# Patient Record
Sex: MALE | Race: BLACK OR AFRICAN AMERICAN | Employment: OTHER | URBAN - METROPOLITAN AREA
[De-identification: names, ages, dates, MRNs, and addresses within clinical notes are randomized per-mention and may not be internally consistent; named-entity substitution may affect disease eponyms.]

---

## 2018-07-02 ENCOUNTER — OFFICE VISIT (OUTPATIENT)
Dept: ORTHOPEDIC SURGERY | Age: 63
End: 2018-07-02

## 2018-07-02 VITALS
SYSTOLIC BLOOD PRESSURE: 108 MMHG | TEMPERATURE: 97.8 F | BODY MASS INDEX: 30.45 KG/M2 | HEIGHT: 67 IN | WEIGHT: 194 LBS | DIASTOLIC BLOOD PRESSURE: 75 MMHG | HEART RATE: 61 BPM

## 2018-07-02 DIAGNOSIS — M16.12 ARTHRITIS OF LEFT HIP: Primary | ICD-10-CM

## 2018-07-02 PROCEDURE — 99203 OFFICE O/P NEW LOW 30 MIN: CPT | Performed by: ORTHOPAEDIC SURGERY

## 2018-07-02 PROCEDURE — G8427 DOCREV CUR MEDS BY ELIG CLIN: HCPCS | Performed by: ORTHOPAEDIC SURGERY

## 2018-07-02 PROCEDURE — G8417 CALC BMI ABV UP PARAM F/U: HCPCS | Performed by: ORTHOPAEDIC SURGERY

## 2018-07-02 PROCEDURE — 4040F PNEUMOC VAC/ADMIN/RCVD: CPT | Performed by: ORTHOPAEDIC SURGERY

## 2018-07-02 PROCEDURE — 1123F ACP DISCUSS/DSCN MKR DOCD: CPT | Performed by: ORTHOPAEDIC SURGERY

## 2018-07-02 PROCEDURE — 4004F PT TOBACCO SCREEN RCVD TLK: CPT | Performed by: ORTHOPAEDIC SURGERY

## 2018-07-02 PROCEDURE — 3017F COLORECTAL CA SCREEN DOC REV: CPT | Performed by: ORTHOPAEDIC SURGERY

## 2018-07-02 RX ORDER — CYCLOBENZAPRINE HCL 10 MG
10 TABLET ORAL 3 TIMES DAILY PRN
Qty: 90 TABLET | Refills: 0 | Status: SHIPPED | OUTPATIENT
Start: 2018-07-02 | End: 2018-07-16 | Stop reason: SDUPTHER

## 2018-07-02 NOTE — LETTER
[facility] Ortho & Spine  Surgery Scheduling Form:  [facility]    DEMOGRAPHICS:                                                                                                                  Patient Name:  Walter Urban  Patient :  1955   Patient SS#:  [id number]    Patient Phone:  [phone] (home)                             Patient Address:  [address] [place] [address]    PCP:  No primary care provider on file. Insurance:   Payor/Plan Subscr  Sex Relation Sub. Ins. ID Effective Group Num   1. [place] 1955 Male Self [id number]                                    [address]   2. MEDICARE - MEAnniece Look 1955 Male  [id number] 10/1/16                                    [address] [address]     DIAGNOSIS & PROCEDURE:                                                                                              Diagnosis:   right hip arthritis  Operation:  Right Anterior Total Hip Replacement with C-Arm  Location: [facility]  Surgeon:  Yadira Jamil MD    SCHEDULING INFORMATION:                                                                                         .  Surgeon's Scheduling Instruction:  elective    Requested Date:    OR Time:   Patient Arrival Time:      OR Time Required:  90  Minutes  Anesthesia:  General  Equipment:  [place] Table, depuy, advanced  Mini C-Arm:  No   Standard C-Arm:  Yes  Status:    SAME DAY ADMIT  PAT Required:  Yes  Comments: NO femoral nerve block    ALLERGIES:Patient has no known allergies. Vitor Gaona.  Salena Hinds MD      18 2:46 PM  BILLING INFORMATION:                                                                                                       Procedure:       CPT Code Modifier                                              H&P AT :      PCP ___XX__                  URGENT CARE ______    Walter Urban  RIGHT  TOTAL HIP REPLACEMENT trust, it is your duty to keep everything you hear confidential.  Nothing that identifies a participant in any way (including job, ethnicity, [employer], etc.) can be shared outside of this group setting. I have read and agree to the following statements:        · I agree to meet with a group of patients and my doctor. I understand that I have the choice to be seen by my physician in this group or individually and that I may leave the group visit anytime I feel uncomfortable. · I understand that discussions will occur regarding individual identifiable health information during the shared medical appointment and I will maintain the confidentiality of [facility] health information or other information heard during the appointment. · I am committed to maintaining this confidentiality even if I am no longer participating in shared medical appointments. · I understand that the information I share with the physician and staff will be heard by the other participants and there is a possibility that the information disclosed in the shared medical appointment may be re-disclosed by other participants. I have been notified of this potential disclosure and I voluntarily agree to participate in the shared medical appointment. · I know that I dont have to share any personal information with the group or health care providers unless, I choose to do so. · Like any doctors appointment, I agree to be responsible for the bill and / or co-payment associated with this physician appointment. Shared Medical Appointment              THIS SUPPLEMENTAL NOTICE SUPPLEMENTS AND DOES NOT REPLACE THE [facility] CONSENT, PATIENT RESPONSIBILITY AND NOTICE OF PRIVACY PRACTICE.         [facility]    1955        Patients Signature: ____________________________________________________         DATE: ____/____/___      Herbert Russo / Support Persons Signature (if applicable)

## 2018-07-04 NOTE — PROGRESS NOTES
Patient is a 57-year-old male presents today complaining of bilateral hip pain has been going on for over 2 years. [facility] and was told he needs a total hip however it was too complex for them to do. He's here now complaining of pain loss of range of motion and difficulty with ambulation. He rates his pain at up to 7-8 out of 10 has difficulty with ambulating and is now significantly affecting his daily activities and also his quality of life now is impinged upon as all activities hurt. The patient has no history of injury or surgery to the right hip or the left hip. He complains of left hip significantly worse than the right hip this far as its pain is concerned. The patient has no history of diabetes or family history. Patient states he does not take narcotics and has no DVT history. He also states he is not using any hormone replacement therapy. The patient has been treated with anti-inflammatories and some mild physical therapy however the therapy just increases his symptoms and the anti-inflammatories are not as effective as we would hope. Patient states that he does not smoke cigarettes but occasionally smokes cannabis. He's here to discuss possible total hip arthroplasty. There is no problem list on file for this patient. Prior to Visit Medications    Medication Sig Taking? Authorizing Provider   MELOXICAM PO Take by mouth Yes Historical Provider, MD   Abacavir-Dolutegravir-Lamivud (TRIUMEQ PO) Take by mouth Yes Historical Provider, MD   GABAPENTIN PO Take by mouth Yes Historical Provider, MD   cyclobenzaprine (FLEXERIL) 10 MG tablet Take 1 tablet by mouth 3 times daily as needed for Muscle spasms Yes Carmen Coelho MD     Physical examination 57-year-old male oriented ×3 temperature is 97.8. Examination of his back shows significant loss of range of motion but no specific pain tenderness or instability.   Motor exam shows quadriceps

## 2018-07-16 RX ORDER — CYCLOBENZAPRINE HCL 10 MG
10 TABLET ORAL 3 TIMES DAILY PRN
Qty: 90 TABLET | Refills: 0 | Status: SHIPPED | OUTPATIENT
Start: 2018-07-16 | End: 2018-07-26

## 2018-07-16 NOTE — TELEPHONE ENCOUNTER
[facility] not [facility].   Per [facility] ok to send to [facility] on [address] and MAEGAN [address]

## 2018-07-31 ENCOUNTER — SURG/PROC ORDERS (OUTPATIENT)
Dept: ORTHOPEDIC SURGERY | Age: 63
End: 2018-07-31

## 2018-07-31 ENCOUNTER — HOSPITAL ENCOUNTER (OUTPATIENT)
Dept: PREADMISSION TESTING | Age: 63
Discharge: HOME | End: 2018-07-31
Attending: ORTHOPAEDIC SURGERY | Admitting: ORTHOPAEDIC SURGERY

## 2018-07-31 DIAGNOSIS — M16.12 PRIMARY OSTEOARTHRITIS OF LEFT HIP: Primary | ICD-10-CM

## 2018-07-31 DIAGNOSIS — M16.12 PRIMARY OSTEOARTHRITIS OF LEFT HIP: ICD-10-CM

## 2018-07-31 LAB
ABO/RH: NORMAL
ANTIBODY SCREEN: NORMAL
BILIRUBIN URINE: NEGATIVE
BLOOD, URINE: NEGATIVE
CLARITY: CLEAR
COLOR: YELLOW
ESTIMATED AVERAGE GLUCOSE: 102.5 MG/DL
GLUCOSE URINE: NEGATIVE MG/DL
HBA1C MFR BLD: 5.2 %
KETONES, URINE: NEGATIVE MG/DL
LEUKOCYTE ESTERASE, URINE: NEGATIVE
MICROSCOPIC EXAMINATION: NORMAL
NITRITE, URINE: NEGATIVE
PH UA: 5.5
PREALBUMIN: 21 MG/DL (ref 20–40)
PROTEIN UA: NEGATIVE MG/DL
SPECIFIC GRAVITY UA: 1.01
URINE REFLEX TO CULTURE: NORMAL
URINE TYPE: NORMAL
UROBILINOGEN, URINE: 0.2 E.U./DL
VITAMIN D 25-HYDROXY: 40.5 NG/ML

## 2018-08-01 LAB — MRSA SCREEN RT-PCR: NORMAL

## 2018-08-01 NOTE — PROGRESS NOTES
Pt. Attended JET class on 7/31/18 Pt. Verified surgery for Total HIP replacement and received patient information and educational JET folder. Interviews completed by PT, OT, Case management and PAT. Labs and Tests completed as ordered/necessary. Pt. Given instructions on Pre-operative Showering techniques and the use of anti-septic 3 days before surgery. Anti-septic bottle given to patient to take home. Pt. States no further questions or concerns.

## 2018-08-02 ENCOUNTER — OFFICE VISIT (OUTPATIENT)
Dept: ORTHOPEDIC SURGERY | Age: 63
End: 2018-08-02

## 2018-08-02 DIAGNOSIS — M16.12 PRIMARY OSTEOARTHRITIS OF LEFT HIP: Primary | ICD-10-CM

## 2018-08-02 PROCEDURE — 99999 PR OFFICE/OUTPT VISIT,PROCEDURE ONLY: CPT | Performed by: ORTHOPAEDIC SURGERY

## 2018-08-02 NOTE — PROGRESS NOTES
Latisha Wade is here to discuss surgical options. I had a 45 minute group discussion with greater than 50% of the time counseling the patient regarding joint arthroplasty, its preoperative evaluation, and post operative physical therapy, and pain managaement We went over the surgical procedure of hip replacement risks and complications of hip replacement including bleeding, infection, fracture, dislocation, instability, persistent pain, leg length discrepancy, neurovascular injury,  DVT, pulmonary embolism, post operative cognitive disorders, and need for revision. He  understands these and we will see the patient in the operating room. There were no vitals taken for this visit. As this patient has demonstrated risk factors for osteoporosis, such as age greater than [de-identified] years and evidence of a fracture, I have referred the patient back to the primary care physician for evaluation for osteoporosis, including consideration for DEXA scanning, if this is felt to be clinically indicated. The patient is advised to contact the primary care physician to follow-up for further evaluation. Plan:  left total hip arthroplasty to be tenatively performed on 8/7/18. Please inform the patient's primary care provider.

## 2018-08-07 PROBLEM — M16.11 ARTHRITIS OF RIGHT HIP: Status: ACTIVE | Noted: 2018-08-07

## 2018-08-23 ENCOUNTER — OFFICE VISIT (OUTPATIENT)
Dept: ORTHOPEDIC SURGERY | Age: 63
End: 2018-08-23

## 2018-08-23 VITALS — BODY MASS INDEX: 31.08 KG/M2 | HEIGHT: 67 IN | WEIGHT: 198 LBS | TEMPERATURE: 98.8 F

## 2018-08-23 DIAGNOSIS — Z96.641 HISTORY OF TOTAL HIP ARTHROPLASTY, RIGHT: Primary | ICD-10-CM

## 2018-08-23 PROCEDURE — APPSS15 APP SPLIT SHARED TIME 0-15 MINUTES: Performed by: PHYSICIAN ASSISTANT

## 2018-08-23 PROCEDURE — 99024 POSTOP FOLLOW-UP VISIT: CPT | Performed by: PHYSICIAN ASSISTANT

## 2018-08-27 ENCOUNTER — TELEPHONE (OUTPATIENT)
Dept: ORTHOPEDIC SURGERY | Age: 63
End: 2018-08-27

## 2018-08-27 DIAGNOSIS — M16.11 ARTHRITIS OF RIGHT HIP: ICD-10-CM

## 2018-08-27 RX ORDER — OXYCODONE HYDROCHLORIDE AND ACETAMINOPHEN 5; 325 MG/1; MG/1
1 TABLET ORAL EVERY 4 HOURS PRN
Qty: 60 TABLET | Refills: 0 | Status: SHIPPED | OUTPATIENT
Start: 2018-08-27 | End: 2018-09-06 | Stop reason: SDUPTHER

## 2018-08-27 NOTE — TELEPHONE ENCOUNTER
Patient states he needs a refill of Percocet 5/325 mg, last filled 8/8/18    Preferred Pharmacy [facility]- [phone]  Patient asking if someone can call him to let him know this has been done    He can be reached at [phone]

## 2018-08-27 NOTE — TELEPHONE ENCOUNTER
Patient called back to see if refill would be sent in today- he states that he is in a lot of pain    Pls call patient to let him know if this is going to be addressed today-   [phone]

## 2018-09-06 ENCOUNTER — TELEPHONE (OUTPATIENT)
Dept: ORTHOPEDIC SURGERY | Age: 63
End: 2018-09-06

## 2018-09-06 DIAGNOSIS — M16.11 ARTHRITIS OF RIGHT HIP: ICD-10-CM

## 2018-09-06 RX ORDER — OXYCODONE HYDROCHLORIDE AND ACETAMINOPHEN 5; 325 MG/1; MG/1
1 TABLET ORAL EVERY 6 HOURS PRN
Qty: 60 TABLET | Refills: 0 | Status: SHIPPED | OUTPATIENT
Start: 2018-09-06 | End: 2018-09-20 | Stop reason: SDUPTHER

## 2018-09-10 ENCOUNTER — HOSPITAL ENCOUNTER (OUTPATIENT)
Dept: OTHER | Age: 63
Discharge: HOME | End: 2018-09-17
Attending: ORTHOPAEDIC SURGERY | Admitting: ORTHOPAEDIC SURGERY

## 2018-09-13 ENCOUNTER — OFFICE VISIT (OUTPATIENT)
Dept: ORTHOPEDIC SURGERY | Age: 63
End: 2018-09-13

## 2018-09-13 VITALS — BODY MASS INDEX: 31.82 KG/M2 | HEIGHT: 66 IN | TEMPERATURE: 97.7 F | WEIGHT: 198 LBS

## 2018-09-13 DIAGNOSIS — M16.12 PRIMARY OSTEOARTHRITIS OF LEFT HIP: ICD-10-CM

## 2018-09-13 DIAGNOSIS — Z96.641 HISTORY OF TOTAL HIP ARTHROPLASTY, RIGHT: ICD-10-CM

## 2018-09-13 DIAGNOSIS — M25.552 LEFT HIP PAIN: Primary | ICD-10-CM

## 2018-09-13 PROCEDURE — 1123F ACP DISCUSS/DSCN MKR DOCD: CPT | Performed by: ORTHOPAEDIC SURGERY

## 2018-09-13 PROCEDURE — 3017F COLORECTAL CA SCREEN DOC REV: CPT | Performed by: ORTHOPAEDIC SURGERY

## 2018-09-13 PROCEDURE — 1101F PT FALLS ASSESS-DOCD LE1/YR: CPT | Performed by: ORTHOPAEDIC SURGERY

## 2018-09-13 PROCEDURE — 4040F PNEUMOC VAC/ADMIN/RCVD: CPT | Performed by: ORTHOPAEDIC SURGERY

## 2018-09-13 PROCEDURE — 4004F PT TOBACCO SCREEN RCVD TLK: CPT | Performed by: ORTHOPAEDIC SURGERY

## 2018-09-13 PROCEDURE — G8427 DOCREV CUR MEDS BY ELIG CLIN: HCPCS | Performed by: ORTHOPAEDIC SURGERY

## 2018-09-13 PROCEDURE — 99214 OFFICE O/P EST MOD 30 MIN: CPT | Performed by: ORTHOPAEDIC SURGERY

## 2018-09-13 PROCEDURE — G8417 CALC BMI ABV UP PARAM F/U: HCPCS | Performed by: ORTHOPAEDIC SURGERY

## 2018-09-13 NOTE — PROGRESS NOTES
This dictation was done with Karma Snap dictation and may contain mechanical errors related to translation. Temperature 97.7 °F (36.5 °C), height 5' 6\" (1.676 m), weight 198 lb (89.8 kg). RAPT  RISK ASSESSMENT and PREDICTION TOOL    Name: Lorenza Daniels  YOB: 1955  Surgeon: Dr. Venu Carpenter         Value Score    1). What is your age group? 50 - 65 years  = 2      66 - 75 years = 1     > 75 years = 0       Your score = 2   2). Gender? Male = 2     Female = 1       Your score = 2   3). How far on average can you walk? Two blocks or more (+/- rest) = 2    (a block is 200 qvwrur=330 ft)  1 - 2 blocks (+/- rest) = 1     Housebound (most of time) = 0       Your score = 1   4). Which gait aid do you use? None = 2    (more often than not) Single-point stick = 1     Crutches/walker = 0       Your score = 0   5). Do you use community supports? None or one per week = 1    (home help, meals on wheels, district nursing) Two or more per week = 0       Your score = 1   6). Will you live with someone who can care for you after your operation? yes = 3     no = 0       Your score = 0    Your Total Score (out of 12) = 6       Key: Destination at discharge from acute care predicted by score.   Score < 6  = extended inpatient rehabilitation  Score 6 - 9  = additional intervention to discharge directly home (Rehab in the home)  Score > 9  = directly home      Patient's Preference Prediction Score Agreed destination   [facility] 6 yes   Lorenza Daniels  Date: 9/13/18

## 2018-09-13 NOTE — LETTER
ALLERGIES:Patient has no known allergies. SURG  10-31           JET   10-23                        ________________________________________________________________  PRE-OP ORDERS:  ? CBC WITH DIFFERENTIAL                                                ? TYPE AND SCREEN                                                            ? HgB A1C     ? VITAMIN D LEVEL  ? PREALBUMIN AND ALBUMIN LEVEL                                                                       ? EKG                                                                                        ? NASAL CULUTRE MRSA  ? UAR/if positive repeat UAR on admissioin  ? BMP  ? COAG PROFILE  ? SED RATE  ? PT/OT EVAL AND TEACHING  ? INSTRUCT PT TO STOP ALL NSAIDS, ASPIRIN, BLOOD THINNERS 7 DAYS PRIOR SURGERY  DAY OF SURGERY  ? CEFAZOLIN 2 GM IVPB; IF PATIENT WEIGHS > 80 KG AND SERUM CREATININE <2.5 mg/dl, GIVE 2 GM DOSE WITHIN 1 HOUR OF INCISION. ? IF THE PRE-OP NASAL CULTURE FOR MRSA WAS POSITIVE:   REPEAT NASAL SWAB ON ADMISSION AND ADMINISTER VANCOMYCIN 1 GM IVPB, REDUCE THE DOSE OF VANCOMYCIN  MG IVPB IF PT < 55 KG OR SERUM CREATININE > 2mg/dl; also to get Cefazolin 2 GM or wt based  ? All patients will receive preop Cefazolin 2 GM or wt based  ? APPLY KNEE HIGH ANTI-EMBOLIC AND PNEUMO-BOOTS TO UNOPERATIVE  LEG  ? CELEBREX  200 MG  ORALLY  DAY OF SURGERY  ? ROXICODONE 10MG  ORALLY DAY OF SURGERY  OTHER ORDERS: ____________________________________________________________  PHYSICIAN SIGNATURE:    __________________________DATE:9/13/18 12:01 PM                    Supplemental Confidentiality & Payment Agreement & Supplemental HIPAA Notice for Shared Medical Appointments        During shared medical appointments, you will hear about other participants health issues and personal information.   As a matter of trust, it is your duty to keep everything you hear confidential.  Nothing that identifies a participant in any way (including job, ethnicity, [employer], etc.) can be shared outside of this group setting. I have read and agree to the following statements:        · I agree to meet with a group of patients and my doctor. I understand that I have the choice to be seen by my physician in this group or individually and that I may leave the group visit anytime I feel uncomfortable. · I understand that discussions will occur regarding individual identifiable health information during the shared medical appointment and I will maintain the confidentiality of [facility] health information or other information heard during the appointment. · I am committed to maintaining this confidentiality even if I am no longer participating in shared medical appointments. · I understand that the information I share with the physician and staff will be heard by the other participants and there is a possibility that the information disclosed in the shared medical appointment may be re-disclosed by other participants. I have been notified of this potential disclosure and I voluntarily agree to participate in the shared medical appointment. · I know that I dont have to share any personal information with the group or health care providers unless, I choose to do so. · Like any doctors appointment, I agree to be responsible for the bill and / or co-payment associated with this physician appointment. Shared Medical Appointment              THIS SUPPLEMENTAL NOTICE SUPPLEMENTS AND DOES NOT REPLACE THE [facility] CONSENT, PATIENT RESPONSIBILITY AND NOTICE OF PRIVACY PRACTICE. Wendy Serrano    1955        Patients Signature: ____________________________________________________         DATE: ____/____/___      Driss Aw / Support Persons Signature (if applicable) _________________________     DATE: __/__/__    **Each person will be asked to sign this commitment before each Shared Medical Appointment. Thank You ! SURGERY SCHEDULING TIMES                                                                                                                                                      Latisha Wade                                                                                       1955                                                                           SURGERY DATE: ___/___/___                                                                      SURGERY TIME:  ___:___ AM/PM                                                                      ARRIVAL TIME:   ___:___  AM/PM                                                                                                                       **REPORT TO THE INFORMATION                                                  DESK IN THE MAIN LOBBY OF [address]  Surgery Scheduler    [facility]) Physicians  [facility]  [address] [place] [address]  [address] [facility], [address] [place] [address]  "[employer]    [phone]  Phone                                                                        JET INFO         PT NAME:  Latisha Wade              Patient Phone:  [phone] (home)                                           1955    PCP:  PCP:  Arnav Jordan MD                APPT DATE:  ___/___/___      DATE:  10/19/18        H&P __________    LABS: _________                      NEEDED:  __________    EKG:   _________                     NEEDED:  __________      JET DATE:   _______/_______      SURG DATE:   ________/________

## 2018-09-17 NOTE — PROGRESS NOTES
Patient is a 40-year-old male who is status post right total hip arthroplasty on 8/7/2018. He's done very well has no pain and no concerns and is ambulating with just a cane basically for severe left hip pain. He states that his right hip is doing fine however his left hip is constant pain constant throbbing and wants to consider a moving forward to left total hip arthroplasty sometime in the next few months. Patient has no history of injury or surgery to the left hip. He rates his pain at 0 out of 10 on the right side postop and 8 out of 10 on the left side. Patient Active Problem List   Diagnosis    Arthritis of right hip    History of total hip arthroplasty, right-8/7/2018     Prior to Visit Medications    Medication Sig Taking? Authorizing Provider   oxyCODONE-acetaminophen (PERCOCET) 5-325 MG per tablet Take 1 tablet by mouth every 6 hours as needed for Pain for up to 15 days. Rosalina Espino MD   gabapentin (NEURONTIN) 300 MG capsule Take 1 capsule by mouth 3 times daily for 30 days. Brendon Galeazzi Bockerstette, APRN - CNP   aspirin EC 81 MG EC tablet Take 1 tablet by mouth 2 times daily Please avoid missing doses. KEMI Khan - CNP   vitamin D (CHOLECALCIFEROL) 1000 UNIT TABS tablet Take 500 Units by mouth daily  Historical Provider, MD   Multiple Vitamins-Minerals (THERAPEUTIC MULTIVITAMIN-MINERALS) tablet Take 1 tablet by mouth daily  Historical Provider, MD   Abacavir-Dolutegravir-Lamivud (TRIUMEQ PO) Take by mouth  Historical Provider, MD   GABAPENTIN PO Take 300 mg by mouth 3 times daily   Historical Provider, MD     Above medical problems and medications were reviewed with patient today's office visit. Physical exam shows a 40-year-old male oriented ×3 as temperature is 97.7. Examination of his back shows mild decreased range of motion but no specific pain tenderness or instability.   Motor exam shows quadriceps hamstrings hip abductors and abductors for plantar dorsiflexors are intact to both right and left lower extremities 4-5 over 5. Sensation and perfusion are intact to both right and left foot and ankle. There is no numbness or tingling in either right or left lower extremity. Deep tendon reflexes are 0 to knee and 0 to ankle of both right and left lower extremity. No other obvious cutaneous lesions lipedema or cellulitic processes noted in either right or left lower extremity. Examination of his relatively recent right total hip shows his wound is healed good range of motion of the hip joint with no signs of instability or deep sepsis. Examination of his left hip shows significant pain at hi degrees of flexion with poor internal and external rotation with pain but no signs of instability or deep sepsis noted in the left hip. X-rays obtained AP pelvis AP lateral of the left hip shows status post uncemented right total hip arthroplasty and stable overall orientation and alignment. No signs of loosening failure or instability noted. 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 Complete loss of the S tibiofemoral joint with osteophyte and cystic formation. No other obvious fractures tumors or dislocations are noted on these x-rays. Impression 68-year-old male stable status post right total hip arthroplasty now wants to move the left hip due to severe degenerative osteoarthritis. We had a 35 minute face-to-face discussion of which greater than 50% of the time was spent in counseling with this patient concerning left total hip arthroplasty it's preoperative evaluation and its postoperative pain management and follow-up.   We went over the surgical procedure risks and complications including bleeding, infection,  neurovascular injury, decreased range of motion, persistent pain, instability with dislocation, DVT, pulmonary embolism, leg length inequality, change in mental status, and need for further surgical procedures. The patient understands this and we have answered all of their questions and concerns. A with this patient to continue his physical therapy for his right hip and we will prepare him for a left hip arthroplasty sometime in the next 3-4 months.

## 2018-09-18 ENCOUNTER — TELEPHONE (OUTPATIENT)
Dept: ORTHOPEDIC SURGERY | Age: 63
End: 2018-09-18

## 2018-09-18 DIAGNOSIS — Z96.641 HISTORY OF TOTAL RIGHT HIP REPLACEMENT: Primary | ICD-10-CM

## 2018-09-18 DIAGNOSIS — M16.11 ARTHRITIS OF RIGHT HIP: ICD-10-CM

## 2018-09-21 ENCOUNTER — TELEPHONE (OUTPATIENT)
Dept: ORTHOPEDIC SURGERY | Age: 63
End: 2018-09-21

## 2018-09-21 RX ORDER — OXYCODONE HYDROCHLORIDE AND ACETAMINOPHEN 5; 325 MG/1; MG/1
1 TABLET ORAL EVERY 8 HOURS PRN
Qty: 60 TABLET | Refills: 0 | Status: SHIPPED | OUTPATIENT
Start: 2018-09-21 | End: 2018-10-10 | Stop reason: SDUPTHER

## 2018-09-24 ENCOUNTER — HOSPITAL ENCOUNTER (OUTPATIENT)
Dept: PHYSICAL THERAPY | Age: 63
Setting detail: THERAPIES SERIES
Discharge: HOME | End: 2018-09-24
Payer: MEDICARE

## 2018-09-24 PROCEDURE — 97110 THERAPEUTIC EXERCISES: CPT

## 2018-09-24 NOTE — FLOWSHEET NOTE
Physical Therapy Daily Treatment Note  Date:  2018    Patient Name:  Sveta Tomlin    :  1955  MRN: [id number]    Restrictions/Precautions: Position Activity Restriction  Other position/activity restrictions: Hip Precautions: No hip external rotation, No ADduction, No hip extension, No hip flexion > 90 degrees    Pertinent Medical History: Additional Pertinent Hx: arthritis,  HIV    Medical/Treatment Diagnosis Information:  · Diagnosis: Right THR - anterior approach  · Treatment Diagnosis: Decreased functional mobility post right THR, left hip OA    Insurance/Certification information:  PT Insurance Information: [facility] [email or web address] dual  Physician Information:  Referring Practitioner: Timothy Stevens MD visit    Plan of care signed (Y/N): YES    Visit# / total visits:   Pain level:  0/10     G-Code (if applicable):  Visit 1       Functional Assessment Tool Used: LEFs  Score: 28   60-79%  Functional Limitation: Mobility: Walking and moving around  Mobility: Walking and Moving Around Current Status (): At least 60 percent but less than 80 percent impaired, limited or restricted  Mobility: Walking and Moving Around Goal Status (): At least 1 percent but less than 20 percent impaired, limited or restricted    History of Injury:  : Right THr  - at [facility] 9 days then to home with Home PT    Subjective:    Right hip moving better, left hip a problem with rainy weather. Objective:    AROM RLE (degrees)  RLE General AROM: right  hip flexion to 90, abduction NT, IR ER NT,  knee and ankle wfl  AROM LLE (degrees)  LLE General AROM: left hip flexion 75 limited by pain knee and ankle wfl     Limited by left hip pain as well - HOLD ex crossed out  2/2 LLE pain /2 rain.   Exercise/Equipment Resistance/Repetitions Other comments     Right THR 18   Nustep L-0 x5 min     self paced Left HIp OA-pain   GSS   20 sec x 3   Cannot stand on left leg to do right leg ex for a prolonged time pain.    Electronically signed by:  Lila Raya, [address] [facility]

## 2018-09-26 ENCOUNTER — HOSPITAL ENCOUNTER (OUTPATIENT)
Dept: PHYSICAL THERAPY | Age: 63
Setting detail: THERAPIES SERIES
Discharge: HOME | End: 2018-09-26
Payer: MEDICARE

## 2018-09-26 PROCEDURE — 97110 THERAPEUTIC EXERCISES: CPT

## 2018-09-26 NOTE — FLOWSHEET NOTE
prolonged time   Mini squats 10    Step ups 6\" 2  x 10 R         Fitter    abd  /neutral  Fwd / neutral   gumdrops   Thin 10x R  Thin x 10 R  1 min        Knee extension 15# 2 x 10 B    Knee flexion   15# 2 x 10 B       Supine march  S-L    Clamshell  Seated HSS  TB hooklying   10 x 2  R only  2x10 R  R/L  2 x 10 Red R only    CP  B hips ant hips X 10 min  Seated recliner     Other Therapeutic Activities:  Pt was educated on PT POC, Diagnosis, Prognosis, pathomechanics as well as frequency and duration of scheduling future physical therapy appointments. Time was also taken on this day to answer all patient questions and participation in PT. Reviewed appointment policy in detail with patient and patient verbalized understanding. Home Exercise Program:  Patient instructed in the following for HEP:   Patient verbalized/demonstrated understanding and was issued written HEP. Timed Code Treatment Minutes: 35    Total Treatment Minutes: 45    Treatment/Activity Tolerance:  [] Patient tolerated treatment well [] Patient limited by fatigue  [x] Patient limited by pain  [] Patient limited by other medical complications  [x] Other: cues to count reps with ex. No c/o of L hip after therapy/ ice. Prognosis: [x] Good [] Fair  [] Poor    Goals:    Short term goals  Time Frame for Short term goals: 2 weeks  Short term goal 1: Indep with HEP  Short term goal 2: Indep with THR posterior precautions      Long term goals  Time Frame for Long term goals : Discharge  Long term goal 1: Normalize right hip ROM for increase ease with donning socks and shoes  Long term goal 2: Increase right hip strength to 4+/5 for normal gait on level and steps.   Long term goal 3: Progress to ambulation with LRAD     Patient Requires Follow-up: [x] Yes  [] No    Plan:   [x] Continue per plan of care [] Alter current plan (see comments)  [] Plan of care initiated [] Hold pending MD visit [] Discharge    Plan for Next Session:  Pt to contact MD

## 2018-09-28 ENCOUNTER — HOSPITAL ENCOUNTER (OUTPATIENT)
Dept: PHYSICAL THERAPY | Age: 63
Setting detail: THERAPIES SERIES
Discharge: HOME | End: 2018-09-28
Payer: MEDICARE

## 2018-09-28 PROCEDURE — 97110 THERAPEUTIC EXERCISES: CPT

## 2018-09-28 NOTE — FLOWSHEET NOTE
care [] Alter current plan (see comments)  [] Plan of care initiated [] Hold pending MD visit [] Discharge    Plan for Next Session:  Pt to contact MD regarding severe Left hip pain.     Electronically signed by:  Majel Aase, [address] [facility]

## 2018-10-01 ENCOUNTER — HOSPITAL ENCOUNTER (OUTPATIENT)
Dept: PHYSICAL THERAPY | Age: 63
Setting detail: THERAPIES SERIES
Discharge: HOME | End: 2018-10-01
Payer: MEDICARE

## 2018-10-01 PROCEDURE — 97110 THERAPEUTIC EXERCISES: CPT

## 2018-10-01 NOTE — FLOWSHEET NOTE
Physical Therapy Daily Treatment Note  Date:  10/1/2018    Patient Name:  Yesi Davila    :  1955  MRN: [id number]    Restrictions/Precautions: Position Activity Restriction  Other position/activity restrictions: Hip Precautions: No hip external rotation, No Adduction, No hip extension, No hip flexion > 90 degrees    Pertinent Medical History: Additional Pertinent Hx: arthritis,  HIV    Medical/Treatment Diagnosis Information:  · Diagnosis: Right THR - anterior approach  · Treatment Diagnosis: Decreased functional mobility post right THR, left hip OA    Insurance/Certification information:  PT Insurance Information: [facility] dual  Physician Information:  Referring Practitioner: Karan Stevens MD visit    Plan of care signed (Y/N): YES    Visit# / total visits:    Pain level:  0/10 R hip, 8/10 L hip     G-Code (if applicable):  Visit 1 46      Functional Assessment Tool Used: LEFs  Score: 28   60-79%  Functional Limitation: Mobility: Walking and moving around  Mobility: Walking and Moving Around Current Status (): At least 60 percent but less than 80 percent impaired, limited or restricted  Mobility: Walking and Moving Around Goal Status (): At least 1 percent but less than 20 percent impaired, limited or restricted    History of Injury:  : Right THR  - at [facility] 9 days then to home with Home PT    Subjective:   R hip is doing well. L hip is still main c/o. Notes waking up at night due to L hip pain.  See MD 10-3-18     Objective:    AROM RLE (degrees)  RLE General AROM: right  hip flexion to 90, abduction NT, IR ER NT,  knee and ankle wfl  AROM LLE (degrees)  LLE General AROM: left hip flexion 75 limited by pain knee and ankle wfl     Limited by left hip pain as well   Exercise/Equipment Resistance/Repetitions Other comments     Right THR 18   Nustep L-0 x5 min     self paced Left HIp OA-pain   GSS   20 sec x 3   Cannot stand on left leg to do right leg ex for a

## 2018-10-03 ENCOUNTER — HOSPITAL ENCOUNTER (OUTPATIENT)
Dept: PHYSICAL THERAPY | Age: 63
Setting detail: THERAPIES SERIES
Discharge: HOME | End: 2018-10-03
Payer: MEDICARE

## 2018-10-03 PROCEDURE — G8978 MOBILITY CURRENT STATUS: HCPCS

## 2018-10-03 PROCEDURE — G8979 MOBILITY GOAL STATUS: HCPCS

## 2018-10-03 PROCEDURE — 97110 THERAPEUTIC EXERCISES: CPT

## 2018-10-04 ENCOUNTER — OFFICE VISIT (OUTPATIENT)
Dept: ORTHOPEDIC SURGERY | Age: 63
End: 2018-10-04
Payer: MEDICARE

## 2018-10-04 VITALS — WEIGHT: 198 LBS | BODY MASS INDEX: 31.08 KG/M2 | TEMPERATURE: 98.1 F | HEIGHT: 67 IN | RESPIRATION RATE: 16 BRPM

## 2018-10-04 DIAGNOSIS — Z96.641 HISTORY OF TOTAL HIP ARTHROPLASTY, RIGHT: Primary | ICD-10-CM

## 2018-10-04 DIAGNOSIS — M16.12 ARTHRITIS OF LEFT HIP: ICD-10-CM

## 2018-10-04 PROCEDURE — 4040F PNEUMOC VAC/ADMIN/RCVD: CPT | Performed by: PHYSICIAN ASSISTANT

## 2018-10-04 PROCEDURE — 3017F COLORECTAL CA SCREEN DOC REV: CPT | Performed by: PHYSICIAN ASSISTANT

## 2018-10-04 PROCEDURE — G8417 CALC BMI ABV UP PARAM F/U: HCPCS | Performed by: PHYSICIAN ASSISTANT

## 2018-10-04 PROCEDURE — 1123F ACP DISCUSS/DSCN MKR DOCD: CPT | Performed by: PHYSICIAN ASSISTANT

## 2018-10-04 PROCEDURE — G8484 FLU IMMUNIZE NO ADMIN: HCPCS | Performed by: PHYSICIAN ASSISTANT

## 2018-10-04 PROCEDURE — 4004F PT TOBACCO SCREEN RCVD TLK: CPT | Performed by: PHYSICIAN ASSISTANT

## 2018-10-04 PROCEDURE — 99212 OFFICE O/P EST SF 10 MIN: CPT | Performed by: PHYSICIAN ASSISTANT

## 2018-10-04 PROCEDURE — 1101F PT FALLS ASSESS-DOCD LE1/YR: CPT | Performed by: PHYSICIAN ASSISTANT

## 2018-10-04 PROCEDURE — G8427 DOCREV CUR MEDS BY ELIG CLIN: HCPCS | Performed by: PHYSICIAN ASSISTANT

## 2018-10-05 ENCOUNTER — HOSPITAL ENCOUNTER (OUTPATIENT)
Dept: PHYSICAL THERAPY | Age: 63
Setting detail: THERAPIES SERIES
End: 2018-10-05
Payer: MEDICARE

## 2018-10-08 ENCOUNTER — PREP FOR PROCEDURE (OUTPATIENT)
Dept: ORTHOPEDICS UNIT | Age: 63
End: 2018-10-08

## 2018-10-08 RX ORDER — CELECOXIB 200 MG/1
200 CAPSULE ORAL ONCE
Status: CANCELLED | OUTPATIENT
Start: 2018-10-08 | End: 2018-10-08

## 2018-10-08 RX ORDER — OXYCODONE HYDROCHLORIDE 5 MG/1
10 TABLET ORAL ONCE
Status: CANCELLED | OUTPATIENT
Start: 2018-10-08 | End: 2018-10-08

## 2018-10-10 ENCOUNTER — TELEPHONE (OUTPATIENT)
Dept: ORTHOPEDIC SURGERY | Age: 63
End: 2018-10-10

## 2018-10-10 DIAGNOSIS — Z96.641 HISTORY OF TOTAL RIGHT HIP REPLACEMENT: ICD-10-CM

## 2018-10-10 DIAGNOSIS — M16.11 ARTHRITIS OF RIGHT HIP: ICD-10-CM

## 2018-10-10 RX ORDER — OXYCODONE HYDROCHLORIDE AND ACETAMINOPHEN 5; 325 MG/1; MG/1
1 TABLET ORAL 2 TIMES DAILY
Qty: 30 TABLET | Refills: 0 | Status: SHIPPED | OUTPATIENT
Start: 2018-10-10 | End: 2018-10-25

## 2018-10-12 ENCOUNTER — HOSPITAL ENCOUNTER (OUTPATIENT)
Dept: PHYSICAL THERAPY | Age: 63
Setting detail: THERAPIES SERIES
Discharge: HOME | End: 2018-10-12
Payer: MEDICARE

## 2018-10-12 NOTE — FLOWSHEET NOTE
Physical Therapy  Cancelled  Patient Name:  Babs Ingram  :  1955   Date:  10/12/2018  Cancelled visits to date: 1  No-shows to date: 1    For today's appointment patient:  [x]  Cancelled  []  Rescheduled appointment  []  No-show     Reason given by patient:  []  Patient ill  []  Conflicting appointment  []  No transportation    []  Conflict with work  []  No reason given  []  Other:     Comments:      Electronically signed by:  Marga Maldonado, [address] [facility]

## 2018-10-16 ENCOUNTER — HOSPITAL ENCOUNTER (OUTPATIENT)
Dept: PHYSICAL THERAPY | Age: 63
Setting detail: THERAPIES SERIES
Discharge: HOME | End: 2018-10-16
Payer: MEDICARE

## 2018-10-16 PROCEDURE — 97110 THERAPEUTIC EXERCISES: CPT

## 2018-10-16 NOTE — FLOWSHEET NOTE
Physical Therapy Daily Treatment Note  Date:  10/16/2018    Patient Name:  Mark Pleitez    :  1955  MRN: [id number]    Restrictions/Precautions: Position Activity Restriction  Other position/activity restrictions: Hip Precautions: No hip external rotation, No Adduction, No hip extension, No hip flexion > 90 degrees    Pertinent Medical History: Additional Pertinent Hx: arthritis,  HIV    Medical/Treatment Diagnosis Information:  · Diagnosis: Right THR - anterior approach  · Treatment Diagnosis: Decreased functional mobility post right THR, left hip OA    Insurance/Certification information:  PT Insurance Information: [facility]  Physician Information:  Referring Practitioner: Snehal Stevens MD visit    Plan of care signed (Y/N): YES    Visit# / total visits:    Pain level:  0/10 R hip, 3/10 L hip     G-Code (if applicable):  LUWSU09,  10/9 18        Functional Assessment Tool Used: LEFs  Score: 28   60-79%  Functional Limitation: Mobility: Walking and moving around  Mobility: Walking and Moving Around Current Status (): At least 40 percent but less than 59  percent impaired, limited or restricted  Mobility: Walking and Moving Around Goal Status (): At least 1 percent but less than 20 percent impaired, limited or restricted    History of Injury:  : Right THR  - at [facility] 9 days then to home with Home PT    Subjective:   R hip is doing well. L hip is still main c/o. Notes waking up at night due to L hip pain. See MD 10-4-18   c/o of L knee at times.   SCHEDULED FOR LEFT THR 10/31    Objective:    AROM RLE (degrees)  RLE General AROM: right  hip flexion to 90, abduction NT, IR ER NT,  knee and ankle wfl  AROM LLE (degrees)  LLE General AROM: left hip flexion 75 limited by pain knee and ankle wfl     Limited by left hip pain as well   Exercise/Equipment Resistance/Repetitions Other comments     Right THR 18   Nustep L-2 x5 min      (inc to 6) Left HIp OA-pain   GSS 20 sec x

## 2018-10-17 ENCOUNTER — HOSPITAL ENCOUNTER (OUTPATIENT)
Dept: PHYSICAL THERAPY | Age: 63
Setting detail: THERAPIES SERIES
Discharge: HOME | End: 2018-10-17
Payer: MEDICARE

## 2018-10-17 PROCEDURE — 97110 THERAPEUTIC EXERCISES: CPT

## 2018-10-17 NOTE — FLOWSHEET NOTE
Physical Therapy Daily Treatment Note  Date:  10/17/2018    Patient Name:  Natalia Richter    :  1955  MRN: [id number]    Restrictions/Precautions: Position Activity Restriction  Other position/activity restrictions: Hip Precautions: No hip external rotation, No Adduction, No hip extension, No hip flexion > 90 degrees    Pertinent Medical History: Additional Pertinent Hx: arthritis,  HIV    Medical/Treatment Diagnosis Information:  · Diagnosis: Right THR - anterior approach  · Treatment Diagnosis: Decreased functional mobility post right THR, left hip OA    Insurance/Certification information:  PT Insurance Information: [facility] dual  Physician Information:  Referring Practitioner: Rao Stevens MD visit    Plan of care signed (Y/N): YES    Visit# / total visits:   ( 10 min late due to needed to use restroom)  Pain level:  0/10 R hip, 4/10 L hip     G-Code (if applicable):  [id number],  10/9 18        Functional Assessment Tool Used: LEFs  Score: 28   60-79%  Functional Limitation: Mobility: Walking and moving around  Mobility: Walking and Moving Around Current Status (): At least 40 percent but less than 59  percent impaired, limited or restricted  Mobility: Walking and Moving Around Goal Status ():  At least 1 percent but less than 20 percent impaired, limited or restricted    History of Injury:  : Right THR  - at [facility] 9 days then to home with Home PT    Subjective:   No new c/o  SCHEDULED FOR LEFT THR 10/31    Objective:    AROM RLE (degrees)  RLE General AROM: right  hip flexion to 90, abduction NT, IR ER NT,  knee and ankle wfl  AROM LLE (degrees)  LLE General AROM: left hip flexion 75 limited by pain knee and ankle wfl     Limited by left hip pain as well   Exercise/Equipment Resistance/Repetitions Other comments     Right THR 18   Nustep L-2 x6 min      (inc to 6) Left HIp OA-pain   GSS 20 sec x 3   Cannot stand on left leg to do right leg ex for a prolonged time   Mini squats 10x    Step ups  Step downs 6\" 2 x10 R  (left hand hold)  6\" x 10       ( nolan hand hold )         Fitter    abd  /neutral  Fwd / neutral     gumdrops   Thin  2 x 10 R  Thin  2 x 10 R    1 min nolan        Knee extension 15# 3 x 10 B    Knee flexion   15# 3 x 10 B    Leg press 50#  2 x 10 B    11 bars  Side step  reebok s/s     10' x 4 , red tb        Deferred due to inc L hip pain last RX   Seated   TBand abd   Red band 2 x 10     Increase reps     CP  B hips ant hips x10 min  Seated     Other Therapeutic Activities:  Pt was educated on PT POC, Diagnosis, Prognosis, pathomechanics as well as frequency and duration of scheduling future physical therapy appointments. Time was also taken on this day to answer all patient questions and participation in PT. Reviewed appointment policy in detail with patient and patient verbalized understanding. Home Exercise Program:  Patient instructed in the following for HEP:   Patient verbalized/demonstrated understanding and was issued written HEP. Timed Code Treatment Minutes: 35  Total Treatment Minutes: 45    Treatment/Activity Tolerance:  [] Patient tolerated treatment well [] Patient limited by fatigue  [x] Patient limited by pain  [] Patient limited by other medical complications  [x] Other: cues to count reps with ex. Prognosis: [x] Good [] Fair  [] Poor    Goals:    Short term goals  Time Frame for Short term goals: 2 weeks  Short term goal 1: Indep with HEP  Short term goal 2: Indep with THR posterior precautions      Long term goals  Time Frame for Long term goals : Discharge  Long term goal 1: Normalize right hip ROM for increase ease with donning socks and shoes  Long term goal 2: Increase right hip strength to 4+/5 for normal gait on level and steps.   Long term goal 3: Progress to ambulation with LRAD     Patient Requires Follow-up: [x] Yes  [] No    Plan:   [x] Continue per plan of care [] Alter current plan (see comments)  [] Plan of care initiated []

## 2018-10-19 ENCOUNTER — HOSPITAL ENCOUNTER (OUTPATIENT)
Dept: PHYSICAL THERAPY | Age: 63
Setting detail: THERAPIES SERIES
Discharge: HOME | End: 2018-10-19
Payer: MEDICARE

## 2018-10-19 PROCEDURE — 97110 THERAPEUTIC EXERCISES: CPT

## 2018-10-19 PROCEDURE — G8980 MOBILITY D/C STATUS: HCPCS

## 2018-10-19 PROCEDURE — G8979 MOBILITY GOAL STATUS: HCPCS

## 2018-10-19 NOTE — FLOWSHEET NOTE
Physical Therapy Daily Treatment Note  Date:  10/19/2018    Patient Name:  Nuzhat Magana    :  1955  MRN: [id number]    Restrictions/Precautions: Position Activity Restriction  Other position/activity restrictions: Hip Precautions: No hip external rotation, No Adduction, No hip extension, No hip flexion > 90 degrees    Pertinent Medical History: Additional Pertinent Hx: arthritis,  HIV    Medical/Treatment Diagnosis Information:  · Diagnosis: Right THR - anterior approach  · Treatment Diagnosis: Decreased functional mobility post right THR, left hip OA    Insurance/Certification information:  PT Insurance Information: [facility] dual  Physician Information:  Referring Practitioner: Candance Prost - MD visit    Plan of care signed (Y/N): YES    Visit# / total visits:  13  Pain level:  0/10 R hip, 4/10 L hip     G-Code (if applicable):  MCANE89  97/47/00        Functional Assessment Tool Used: LEFs  Score: 46  40-59%  Functional Limitation: Mobility: Walking and moving around    Mobility: Walking and Moving Around Goal Status ():  At least 1 percent but less than 20 percent impaired, limited or restricted  Mobility Walking and moving around DC status       40-59%  CK    History of Injury:  : Right THR  - at [facility] 9 days then to home with Home PT    Subjective: Feels that right hip is good, left is not   SCHEDULED FOR LEFT THR 10/31    Objective:    AROM RLE (degrees)  RLE General AROM: right  hip flexion to 90, abduction NT, IR ER NT,  knee and ankle wfl  AROM LLE (degrees)  LLE General AROM: left hip flexion 75 limited by pain knee and ankle wfl     Limited by left hip pain as well   Exercise/Equipment Resistance/Repetitions Other comments     Right THR 18   Nustep L-2 x6 min      (inc to 6) Left HIp OA-pain   GSS 20 sec x 3   Cannot stand on left leg to do right leg ex for a prolonged time   Mini squats 10x    Step ups  Step downs 6\" 2 x10 R  (left hand hold)  6\" x 10       ( nolan hand

## 2018-10-22 ENCOUNTER — SURG/PROC ORDERS (OUTPATIENT)
Dept: ORTHOPEDIC SURGERY | Age: 63
End: 2018-10-22

## 2018-10-22 DIAGNOSIS — M16.12 PRIMARY OSTEOARTHRITIS OF LEFT HIP: Primary | ICD-10-CM

## 2018-10-23 ENCOUNTER — HOSPITAL ENCOUNTER (OUTPATIENT)
Dept: PREADMISSION TESTING | Age: 63
Discharge: HOME | End: 2018-10-27
Payer: MEDICARE

## 2018-10-23 DIAGNOSIS — M16.12 PRIMARY OSTEOARTHRITIS OF LEFT HIP: ICD-10-CM

## 2018-10-23 LAB
ABO/RH: NORMAL
ALBUMIN SERPL-MCNC: 3.8 G/DL (ref 3.4–5)
ANION GAP SERPL CALCULATED.3IONS-SCNC: 9 MMOL/L (ref 3–16)
ANTIBODY SCREEN: NORMAL
APTT: 30.6 SEC (ref 26–36)
BASOPHILS ABSOLUTE: 0.1 K/UL (ref 0–0.2)
BASOPHILS RELATIVE PERCENT: 1.1 %
BILIRUBIN URINE: NEGATIVE
BLOOD, URINE: NEGATIVE
BUN BLDV-MCNC: 12 MG/DL (ref 7–20)
CALCIUM SERPL-MCNC: 9.1 MG/DL (ref 8.3–10.6)
CHLORIDE BLD-SCNC: 107 MMOL/L (ref 99–110)
CLARITY: CLEAR
CO2: 25 MMOL/L (ref 21–32)
COLOR: YELLOW
CREAT SERPL-MCNC: 1.3 MG/DL (ref 0.8–1.3)
EKG ATRIAL RATE: 58 BPM
EKG DIAGNOSIS: NORMAL
EKG P AXIS: 45 DEGREES
EKG P-R INTERVAL: 152 MS
EKG Q-T INTERVAL: 422 MS
EKG QRS DURATION: 80 MS
EKG QTC CALCULATION (BAZETT): 414 MS
EKG R AXIS: 3 DEGREES
EKG T AXIS: 31 DEGREES
EKG VENTRICULAR RATE: 58 BPM
EOSINOPHILS ABSOLUTE: 0.1 K/UL (ref 0–0.6)
EOSINOPHILS RELATIVE PERCENT: 2.4 %
ESTIMATED AVERAGE GLUCOSE: 108.3 MG/DL
GFR AFRICAN AMERICAN: >60
GFR NON-AFRICAN AMERICAN: 55
GLUCOSE BLD-MCNC: 90 MG/DL (ref 70–99)
GLUCOSE URINE: NEGATIVE MG/DL
HBA1C MFR BLD: 5.4 %
HCT VFR BLD CALC: 36.6 % (ref 40.5–52.5)
HEMOGLOBIN: 12.1 G/DL (ref 13.5–17.5)
INR BLD: 0.96 (ref 0.86–1.14)
KETONES, URINE: NEGATIVE MG/DL
LEUKOCYTE ESTERASE, URINE: NEGATIVE
LYMPHOCYTES ABSOLUTE: 1.5 K/UL (ref 1–5.1)
LYMPHOCYTES RELATIVE PERCENT: 30 %
MCH RBC QN AUTO: 30.7 PG (ref 26–34)
MCHC RBC AUTO-ENTMCNC: 33 G/DL (ref 31–36)
MCV RBC AUTO: 93 FL (ref 80–100)
MICROSCOPIC EXAMINATION: NORMAL
MONOCYTES ABSOLUTE: 0.5 K/UL (ref 0–1.3)
MONOCYTES RELATIVE PERCENT: 10.3 %
NEUTROPHILS ABSOLUTE: 2.9 K/UL (ref 1.7–7.7)
NEUTROPHILS RELATIVE PERCENT: 56.2 %
NITRITE, URINE: NEGATIVE
PDW BLD-RTO: 13.5 % (ref 12.4–15.4)
PH UA: 5.5
PLATELET # BLD: 221 K/UL (ref 135–450)
PMV BLD AUTO: 9.1 FL (ref 5–10.5)
POTASSIUM SERPL-SCNC: 4.1 MMOL/L (ref 3.5–5.1)
PREALBUMIN: 21.7 MG/DL (ref 20–40)
PROTEIN UA: NEGATIVE MG/DL
PROTHROMBIN TIME: 10.9 SEC (ref 9.8–13)
RBC # BLD: 3.93 M/UL (ref 4.2–5.9)
SEDIMENTATION RATE, ERYTHROCYTE: 21 MM/HR (ref 0–20)
SODIUM BLD-SCNC: 141 MMOL/L (ref 136–145)
SPECIFIC GRAVITY UA: 1.02
URINE REFLEX TO CULTURE: NORMAL
URINE TYPE: NORMAL
UROBILINOGEN, URINE: 0.2 E.U./DL
VITAMIN D 25-HYDROXY: 36.6 NG/ML
WBC # BLD: 5.1 K/UL (ref 4–11)

## 2018-10-23 PROCEDURE — 87641 MR-STAPH DNA AMP PROBE: CPT

## 2018-10-23 PROCEDURE — 93005 ELECTROCARDIOGRAM TRACING: CPT

## 2018-10-23 PROCEDURE — 80048 BASIC METABOLIC PNL TOTAL CA: CPT

## 2018-10-23 PROCEDURE — 85025 COMPLETE CBC W/AUTO DIFF WBC: CPT

## 2018-10-23 PROCEDURE — 86901 BLOOD TYPING SEROLOGIC RH(D): CPT

## 2018-10-23 PROCEDURE — 82040 ASSAY OF SERUM ALBUMIN: CPT

## 2018-10-23 PROCEDURE — 85730 THROMBOPLASTIN TIME PARTIAL: CPT

## 2018-10-23 PROCEDURE — 85652 RBC SED RATE AUTOMATED: CPT

## 2018-10-23 PROCEDURE — 86850 RBC ANTIBODY SCREEN: CPT

## 2018-10-23 PROCEDURE — 83036 HEMOGLOBIN GLYCOSYLATED A1C: CPT

## 2018-10-23 PROCEDURE — 86900 BLOOD TYPING SEROLOGIC ABO: CPT

## 2018-10-23 PROCEDURE — 82306 VITAMIN D 25 HYDROXY: CPT

## 2018-10-23 PROCEDURE — 81003 URINALYSIS AUTO W/O SCOPE: CPT

## 2018-10-23 PROCEDURE — 85610 PROTHROMBIN TIME: CPT

## 2018-10-23 PROCEDURE — 84134 ASSAY OF PREALBUMIN: CPT

## 2018-10-24 LAB — MRSA SCREEN RT-PCR: NORMAL

## 2018-10-25 ENCOUNTER — OFFICE VISIT (OUTPATIENT)
Dept: ORTHOPEDIC SURGERY | Age: 63
End: 2018-10-25

## 2018-10-25 DIAGNOSIS — M16.12 PRIMARY OSTEOARTHRITIS OF LEFT HIP: Primary | ICD-10-CM

## 2018-10-25 PROCEDURE — 99999 PR OFFICE/OUTPT VISIT,PROCEDURE ONLY: CPT | Performed by: ORTHOPAEDIC SURGERY

## 2018-10-29 RX ORDER — ASCORBIC ACID 100 MG
1 TABLET,CHEWABLE ORAL DAILY
COMMUNITY

## 2018-10-30 ENCOUNTER — ANESTHESIA EVENT (OUTPATIENT)
Dept: OPERATING ROOM | Age: 63
DRG: 970 | End: 2018-10-30
Payer: MEDICARE

## 2018-10-31 ENCOUNTER — APPOINTMENT (OUTPATIENT)
Dept: GENERAL RADIOLOGY | Age: 63
DRG: 970 | End: 2018-10-31
Attending: ORTHOPAEDIC SURGERY
Payer: MEDICARE

## 2018-10-31 ENCOUNTER — HOSPITAL ENCOUNTER (INPATIENT)
Age: 63
LOS: 1 days | Discharge: NURSING, REHAB OR LONG-TERM CARE | DRG: 970 | End: 2018-11-01
Attending: ORTHOPAEDIC SURGERY | Admitting: ORTHOPAEDIC SURGERY
Payer: MEDICARE

## 2018-10-31 ENCOUNTER — ANESTHESIA (OUTPATIENT)
Dept: OPERATING ROOM | Age: 63
DRG: 970 | End: 2018-10-31
Payer: MEDICARE

## 2018-10-31 VITALS
TEMPERATURE: 95.4 F | SYSTOLIC BLOOD PRESSURE: 107 MMHG | DIASTOLIC BLOOD PRESSURE: 78 MMHG | OXYGEN SATURATION: 100 % | RESPIRATION RATE: 10 BRPM

## 2018-10-31 DIAGNOSIS — M16.12 ARTHRITIS OF LEFT HIP: Primary | ICD-10-CM

## 2018-10-31 PROBLEM — M17.12 ARTHRITIS OF LEFT KNEE: Status: ACTIVE | Noted: 2018-10-31

## 2018-10-31 LAB
ABO/RH: NORMAL
ANTIBODY SCREEN: NORMAL
ESTIMATED AVERAGE GLUCOSE: 105.4 MG/DL
GLUCOSE BLD-MCNC: 100 MG/DL (ref 70–99)
GLUCOSE BLD-MCNC: 148 MG/DL (ref 70–99)
HBA1C MFR BLD: 5.3 %
PERFORMED ON: ABNORMAL
PERFORMED ON: ABNORMAL

## 2018-10-31 PROCEDURE — 88304 TISSUE EXAM BY PATHOLOGIST: CPT

## 2018-10-31 PROCEDURE — 7100000001 HC PACU RECOVERY - ADDTL 15 MIN: Performed by: ORTHOPAEDIC SURGERY

## 2018-10-31 PROCEDURE — 3209999900 FLUORO FOR SURGICAL PROCEDURES

## 2018-10-31 PROCEDURE — G8979 MOBILITY GOAL STATUS: HCPCS

## 2018-10-31 PROCEDURE — 6370000000 HC RX 637 (ALT 250 FOR IP): Performed by: ORTHOPAEDIC SURGERY

## 2018-10-31 PROCEDURE — 97161 PT EVAL LOW COMPLEX 20 MIN: CPT

## 2018-10-31 PROCEDURE — 3600000014 HC SURGERY LEVEL 4 ADDTL 15MIN: Performed by: ORTHOPAEDIC SURGERY

## 2018-10-31 PROCEDURE — 86900 BLOOD TYPING SEROLOGIC ABO: CPT

## 2018-10-31 PROCEDURE — 97530 THERAPEUTIC ACTIVITIES: CPT

## 2018-10-31 PROCEDURE — 2580000003 HC RX 258: Performed by: ORTHOPAEDIC SURGERY

## 2018-10-31 PROCEDURE — 86850 RBC ANTIBODY SCREEN: CPT

## 2018-10-31 PROCEDURE — 3700000001 HC ADD 15 MINUTES (ANESTHESIA): Performed by: ORTHOPAEDIC SURGERY

## 2018-10-31 PROCEDURE — 6360000002 HC RX W HCPCS

## 2018-10-31 PROCEDURE — G8988 SELF CARE GOAL STATUS: HCPCS

## 2018-10-31 PROCEDURE — 6360000002 HC RX W HCPCS: Performed by: ORTHOPAEDIC SURGERY

## 2018-10-31 PROCEDURE — 97535 SELF CARE MNGMENT TRAINING: CPT

## 2018-10-31 PROCEDURE — 2500000003 HC RX 250 WO HCPCS: Performed by: ORTHOPAEDIC SURGERY

## 2018-10-31 PROCEDURE — G8987 SELF CARE CURRENT STATUS: HCPCS

## 2018-10-31 PROCEDURE — 0SRB01A REPLACEMENT OF LEFT HIP JOINT WITH METAL SYNTHETIC SUBSTITUTE, UNCEMENTED, OPEN APPROACH: ICD-10-PCS | Performed by: ORTHOPAEDIC SURGERY

## 2018-10-31 PROCEDURE — 2720000010 HC SURG SUPPLY STERILE: Performed by: ORTHOPAEDIC SURGERY

## 2018-10-31 PROCEDURE — 2580000003 HC RX 258

## 2018-10-31 PROCEDURE — 2500000003 HC RX 250 WO HCPCS

## 2018-10-31 PROCEDURE — 3700000000 HC ANESTHESIA ATTENDED CARE: Performed by: ORTHOPAEDIC SURGERY

## 2018-10-31 PROCEDURE — 3600000004 HC SURGERY LEVEL 4 BASE: Performed by: ORTHOPAEDIC SURGERY

## 2018-10-31 PROCEDURE — 97165 OT EVAL LOW COMPLEX 30 MIN: CPT

## 2018-10-31 PROCEDURE — 97116 GAIT TRAINING THERAPY: CPT

## 2018-10-31 PROCEDURE — 36415 COLL VENOUS BLD VENIPUNCTURE: CPT

## 2018-10-31 PROCEDURE — 73502 X-RAY EXAM HIP UNI 2-3 VIEWS: CPT

## 2018-10-31 PROCEDURE — 88311 DECALCIFY TISSUE: CPT

## 2018-10-31 PROCEDURE — 86901 BLOOD TYPING SEROLOGIC RH(D): CPT

## 2018-10-31 PROCEDURE — 2709999900 HC NON-CHARGEABLE SUPPLY: Performed by: ORTHOPAEDIC SURGERY

## 2018-10-31 PROCEDURE — 83036 HEMOGLOBIN GLYCOSYLATED A1C: CPT

## 2018-10-31 PROCEDURE — C1776 JOINT DEVICE (IMPLANTABLE): HCPCS | Performed by: ORTHOPAEDIC SURGERY

## 2018-10-31 PROCEDURE — 1200000000 HC SEMI PRIVATE

## 2018-10-31 PROCEDURE — 2580000003 HC RX 258: Performed by: ANESTHESIOLOGY

## 2018-10-31 PROCEDURE — 7100000000 HC PACU RECOVERY - FIRST 15 MIN: Performed by: ORTHOPAEDIC SURGERY

## 2018-10-31 PROCEDURE — 6360000002 HC RX W HCPCS: Performed by: ANESTHESIOLOGY

## 2018-10-31 DEVICE — IMPLANTABLE DEVICE: Type: IMPLANTABLE DEVICE | Site: HIP | Status: FUNCTIONAL

## 2018-10-31 DEVICE — STEM FEM SZ 4 L103MM 12/14 TAPR HI OFFSET HIP DUOFIX CLLRD: Type: IMPLANTABLE DEVICE | Site: HIP | Status: FUNCTIONAL

## 2018-10-31 DEVICE — CUP ACET DIA58MM HIP GRIPTION PRI CEMENTLESS FIX SECT SER: Type: IMPLANTABLE DEVICE | Site: HIP | Status: FUNCTIONAL

## 2018-10-31 RX ORDER — ACETAMINOPHEN 325 MG/1
650 TABLET ORAL EVERY 4 HOURS PRN
Status: DISCONTINUED | OUTPATIENT
Start: 2018-10-31 | End: 2018-11-01 | Stop reason: HOSPADM

## 2018-10-31 RX ORDER — MORPHINE SULFATE 2 MG/ML
2 INJECTION, SOLUTION INTRAMUSCULAR; INTRAVENOUS
Status: DISCONTINUED | OUTPATIENT
Start: 2018-10-31 | End: 2018-11-01 | Stop reason: HOSPADM

## 2018-10-31 RX ORDER — SODIUM CHLORIDE 0.9 % (FLUSH) 0.9 %
10 SYRINGE (ML) INJECTION PRN
Status: DISCONTINUED | OUTPATIENT
Start: 2018-10-31 | End: 2018-11-01 | Stop reason: HOSPADM

## 2018-10-31 RX ORDER — MIDAZOLAM HYDROCHLORIDE 1 MG/ML
INJECTION INTRAMUSCULAR; INTRAVENOUS PRN
Status: DISCONTINUED | OUTPATIENT
Start: 2018-10-31 | End: 2018-10-31 | Stop reason: SDUPTHER

## 2018-10-31 RX ORDER — LIDOCAINE HYDROCHLORIDE 20 MG/ML
INJECTION, SOLUTION INFILTRATION; PERINEURAL PRN
Status: DISCONTINUED | OUTPATIENT
Start: 2018-10-31 | End: 2018-10-31 | Stop reason: SDUPTHER

## 2018-10-31 RX ORDER — MORPHINE SULFATE 2 MG/ML
2 INJECTION, SOLUTION INTRAMUSCULAR; INTRAVENOUS EVERY 5 MIN PRN
Status: DISCONTINUED | OUTPATIENT
Start: 2018-10-31 | End: 2018-10-31 | Stop reason: HOSPADM

## 2018-10-31 RX ORDER — FENTANYL CITRATE 50 UG/ML
50 INJECTION, SOLUTION INTRAMUSCULAR; INTRAVENOUS EVERY 5 MIN PRN
Status: DISCONTINUED | OUTPATIENT
Start: 2018-10-31 | End: 2018-10-31 | Stop reason: HOSPADM

## 2018-10-31 RX ORDER — OXYCODONE HYDROCHLORIDE AND ACETAMINOPHEN 5; 325 MG/1; MG/1
2 TABLET ORAL PRN
Status: DISCONTINUED | OUTPATIENT
Start: 2018-10-31 | End: 2018-10-31 | Stop reason: HOSPADM

## 2018-10-31 RX ORDER — CEFAZOLIN SODIUM 1 G/3ML
INJECTION, POWDER, FOR SOLUTION INTRAMUSCULAR; INTRAVENOUS PRN
Status: DISCONTINUED | OUTPATIENT
Start: 2018-10-31 | End: 2018-10-31 | Stop reason: SDUPTHER

## 2018-10-31 RX ORDER — OXYCODONE HYDROCHLORIDE 5 MG/1
10 TABLET ORAL ONCE
Status: COMPLETED | OUTPATIENT
Start: 2018-10-31 | End: 2018-10-31

## 2018-10-31 RX ORDER — INSULIN GLARGINE 100 [IU]/ML
0.25 INJECTION, SOLUTION SUBCUTANEOUS NIGHTLY
Status: DISCONTINUED | OUTPATIENT
Start: 2018-10-31 | End: 2018-10-31

## 2018-10-31 RX ORDER — FENTANYL CITRATE 50 UG/ML
25 INJECTION, SOLUTION INTRAMUSCULAR; INTRAVENOUS EVERY 5 MIN PRN
Status: DISCONTINUED | OUTPATIENT
Start: 2018-10-31 | End: 2018-10-31 | Stop reason: HOSPADM

## 2018-10-31 RX ORDER — DEXTROSE MONOHYDRATE 25 G/50ML
12.5 INJECTION, SOLUTION INTRAVENOUS PRN
Status: DISCONTINUED | OUTPATIENT
Start: 2018-10-31 | End: 2018-11-01 | Stop reason: HOSPADM

## 2018-10-31 RX ORDER — ROCURONIUM BROMIDE 10 MG/ML
INJECTION, SOLUTION INTRAVENOUS PRN
Status: DISCONTINUED | OUTPATIENT
Start: 2018-10-31 | End: 2018-10-31 | Stop reason: SDUPTHER

## 2018-10-31 RX ORDER — MEPERIDINE HYDROCHLORIDE 25 MG/ML
12.5 INJECTION INTRAMUSCULAR; INTRAVENOUS; SUBCUTANEOUS EVERY 5 MIN PRN
Status: DISCONTINUED | OUTPATIENT
Start: 2018-10-31 | End: 2018-10-31 | Stop reason: HOSPADM

## 2018-10-31 RX ORDER — GABAPENTIN 300 MG/1
300 CAPSULE ORAL 3 TIMES DAILY
Status: DISCONTINUED | OUTPATIENT
Start: 2018-10-31 | End: 2018-11-01 | Stop reason: HOSPADM

## 2018-10-31 RX ORDER — FENTANYL CITRATE 50 UG/ML
INJECTION, SOLUTION INTRAMUSCULAR; INTRAVENOUS PRN
Status: DISCONTINUED | OUTPATIENT
Start: 2018-10-31 | End: 2018-10-31 | Stop reason: SDUPTHER

## 2018-10-31 RX ORDER — ONDANSETRON 2 MG/ML
4 INJECTION INTRAMUSCULAR; INTRAVENOUS EVERY 6 HOURS PRN
Status: DISCONTINUED | OUTPATIENT
Start: 2018-10-31 | End: 2018-11-01 | Stop reason: HOSPADM

## 2018-10-31 RX ORDER — SODIUM CHLORIDE 0.9 % (FLUSH) 0.9 %
10 SYRINGE (ML) INJECTION EVERY 12 HOURS SCHEDULED
Status: DISCONTINUED | OUTPATIENT
Start: 2018-10-31 | End: 2018-10-31 | Stop reason: HOSPADM

## 2018-10-31 RX ORDER — ONDANSETRON 2 MG/ML
INJECTION INTRAMUSCULAR; INTRAVENOUS PRN
Status: DISCONTINUED | OUTPATIENT
Start: 2018-10-31 | End: 2018-10-31 | Stop reason: SDUPTHER

## 2018-10-31 RX ORDER — OXYCODONE HYDROCHLORIDE AND ACETAMINOPHEN 5; 325 MG/1; MG/1
2 TABLET ORAL EVERY 4 HOURS PRN
Status: DISCONTINUED | OUTPATIENT
Start: 2018-10-31 | End: 2018-11-01 | Stop reason: HOSPADM

## 2018-10-31 RX ORDER — DOCUSATE SODIUM 100 MG/1
100 CAPSULE, LIQUID FILLED ORAL 2 TIMES DAILY
Status: DISCONTINUED | OUTPATIENT
Start: 2018-10-31 | End: 2018-11-01 | Stop reason: HOSPADM

## 2018-10-31 RX ORDER — SODIUM CHLORIDE 9 MG/ML
INJECTION, SOLUTION INTRAVENOUS CONTINUOUS
Status: DISCONTINUED | OUTPATIENT
Start: 2018-10-31 | End: 2018-10-31

## 2018-10-31 RX ORDER — ONDANSETRON 2 MG/ML
4 INJECTION INTRAMUSCULAR; INTRAVENOUS
Status: DISCONTINUED | OUTPATIENT
Start: 2018-10-31 | End: 2018-10-31 | Stop reason: HOSPADM

## 2018-10-31 RX ORDER — SUCCINYLCHOLINE CHLORIDE 20 MG/ML
INJECTION INTRAMUSCULAR; INTRAVENOUS PRN
Status: DISCONTINUED | OUTPATIENT
Start: 2018-10-31 | End: 2018-10-31 | Stop reason: SDUPTHER

## 2018-10-31 RX ORDER — CELECOXIB 200 MG/1
200 CAPSULE ORAL ONCE
Status: COMPLETED | OUTPATIENT
Start: 2018-10-31 | End: 2018-10-31

## 2018-10-31 RX ORDER — SODIUM CHLORIDE 0.9 % (FLUSH) 0.9 %
10 SYRINGE (ML) INJECTION EVERY 12 HOURS SCHEDULED
Status: DISCONTINUED | OUTPATIENT
Start: 2018-10-31 | End: 2018-11-01 | Stop reason: HOSPADM

## 2018-10-31 RX ORDER — MORPHINE SULFATE 2 MG/ML
1 INJECTION, SOLUTION INTRAMUSCULAR; INTRAVENOUS EVERY 5 MIN PRN
Status: DISCONTINUED | OUTPATIENT
Start: 2018-10-31 | End: 2018-10-31 | Stop reason: HOSPADM

## 2018-10-31 RX ORDER — MORPHINE SULFATE 2 MG/ML
4 INJECTION, SOLUTION INTRAMUSCULAR; INTRAVENOUS
Status: DISCONTINUED | OUTPATIENT
Start: 2018-10-31 | End: 2018-11-01 | Stop reason: HOSPADM

## 2018-10-31 RX ORDER — TRANEXAMIC ACID 100 MG/ML
INJECTION, SOLUTION INTRAVENOUS
Status: COMPLETED | OUTPATIENT
Start: 2018-10-31 | End: 2018-10-31

## 2018-10-31 RX ORDER — DEXAMETHASONE SODIUM PHOSPHATE 4 MG/ML
INJECTION, SOLUTION INTRA-ARTICULAR; INTRALESIONAL; INTRAMUSCULAR; INTRAVENOUS; SOFT TISSUE PRN
Status: DISCONTINUED | OUTPATIENT
Start: 2018-10-31 | End: 2018-10-31 | Stop reason: SDUPTHER

## 2018-10-31 RX ORDER — LORAZEPAM 2 MG/ML
0.5 INJECTION INTRAMUSCULAR ONCE
Status: DISCONTINUED | OUTPATIENT
Start: 2018-10-31 | End: 2018-11-01 | Stop reason: HOSPADM

## 2018-10-31 RX ORDER — SODIUM CHLORIDE 0.9 % (FLUSH) 0.9 %
10 SYRINGE (ML) INJECTION PRN
Status: DISCONTINUED | OUTPATIENT
Start: 2018-10-31 | End: 2018-10-31 | Stop reason: HOSPADM

## 2018-10-31 RX ORDER — CALCIUM CARBONATE/VITAMIN D3 250-3.125
2 TABLET ORAL NIGHTLY
Status: DISCONTINUED | OUTPATIENT
Start: 2018-10-31 | End: 2018-11-01 | Stop reason: HOSPADM

## 2018-10-31 RX ORDER — OXYCODONE HYDROCHLORIDE AND ACETAMINOPHEN 5; 325 MG/1; MG/1
1 TABLET ORAL PRN
Status: DISCONTINUED | OUTPATIENT
Start: 2018-10-31 | End: 2018-10-31 | Stop reason: HOSPADM

## 2018-10-31 RX ORDER — SODIUM CHLORIDE 450 MG/100ML
INJECTION, SOLUTION INTRAVENOUS CONTINUOUS
Status: DISCONTINUED | OUTPATIENT
Start: 2018-10-31 | End: 2018-11-01 | Stop reason: HOSPADM

## 2018-10-31 RX ORDER — HYDROMORPHONE HCL 110MG/55ML
PATIENT CONTROLLED ANALGESIA SYRINGE INTRAVENOUS PRN
Status: DISCONTINUED | OUTPATIENT
Start: 2018-10-31 | End: 2018-10-31 | Stop reason: SDUPTHER

## 2018-10-31 RX ORDER — OXYCODONE HYDROCHLORIDE AND ACETAMINOPHEN 5; 325 MG/1; MG/1
1 TABLET ORAL EVERY 4 HOURS PRN
Status: DISCONTINUED | OUTPATIENT
Start: 2018-10-31 | End: 2018-11-01 | Stop reason: HOSPADM

## 2018-10-31 RX ORDER — NICOTINE POLACRILEX 4 MG
15 LOZENGE BUCCAL PRN
Status: DISCONTINUED | OUTPATIENT
Start: 2018-10-31 | End: 2018-11-01 | Stop reason: HOSPADM

## 2018-10-31 RX ORDER — PROPOFOL 10 MG/ML
INJECTION, EMULSION INTRAVENOUS PRN
Status: DISCONTINUED | OUTPATIENT
Start: 2018-10-31 | End: 2018-10-31 | Stop reason: SDUPTHER

## 2018-10-31 RX ORDER — DEXTROSE MONOHYDRATE 50 MG/ML
100 INJECTION, SOLUTION INTRAVENOUS PRN
Status: DISCONTINUED | OUTPATIENT
Start: 2018-10-31 | End: 2018-11-01 | Stop reason: HOSPADM

## 2018-10-31 RX ADMIN — HYDROMORPHONE HYDROCHLORIDE 0.5 MG: 2 INJECTION, SOLUTION INTRAMUSCULAR; INTRAVENOUS; SUBCUTANEOUS at 09:40

## 2018-10-31 RX ADMIN — SODIUM CHLORIDE: 4.5 INJECTION, SOLUTION INTRAVENOUS at 13:46

## 2018-10-31 RX ADMIN — PROPOFOL 160 MG: 10 INJECTION, EMULSION INTRAVENOUS at 09:06

## 2018-10-31 RX ADMIN — FENTANYL CITRATE 50 MCG: 50 INJECTION INTRAMUSCULAR; INTRAVENOUS at 09:28

## 2018-10-31 RX ADMIN — ENOXAPARIN SODIUM 40 MG: 40 INJECTION SUBCUTANEOUS at 20:16

## 2018-10-31 RX ADMIN — FENTANYL CITRATE 50 MCG: 50 INJECTION INTRAMUSCULAR; INTRAVENOUS at 09:06

## 2018-10-31 RX ADMIN — DOCUSATE SODIUM 100 MG: 100 CAPSULE, LIQUID FILLED ORAL at 13:46

## 2018-10-31 RX ADMIN — CEFAZOLIN SODIUM 2000 MG: 1 INJECTION, POWDER, FOR SOLUTION INTRAMUSCULAR; INTRAVENOUS at 09:06

## 2018-10-31 RX ADMIN — SODIUM CHLORIDE: 9 INJECTION, SOLUTION INTRAVENOUS at 08:52

## 2018-10-31 RX ADMIN — Medication 10 ML: at 20:20

## 2018-10-31 RX ADMIN — MIDAZOLAM 2 MG: 1 INJECTION INTRAMUSCULAR; INTRAVENOUS at 08:58

## 2018-10-31 RX ADMIN — CELECOXIB 200 MG: 200 CAPSULE ORAL at 08:52

## 2018-10-31 RX ADMIN — LIDOCAINE HYDROCHLORIDE 100 MG: 20 INJECTION, SOLUTION INFILTRATION; PERINEURAL at 09:07

## 2018-10-31 RX ADMIN — OXYCODONE AND ACETAMINOPHEN 2 TABLET: 5; 325 TABLET ORAL at 23:11

## 2018-10-31 RX ADMIN — CALCIUM CARBONATE-CHOLECALCIFEROL TAB 250 MG-125 UNIT 500 MG: 250-125 TAB at 20:20

## 2018-10-31 RX ADMIN — DOCUSATE SODIUM 100 MG: 100 CAPSULE, LIQUID FILLED ORAL at 20:16

## 2018-10-31 RX ADMIN — HYDROMORPHONE HYDROCHLORIDE 0.5 MG: 2 INJECTION, SOLUTION INTRAMUSCULAR; INTRAVENOUS; SUBCUTANEOUS at 10:56

## 2018-10-31 RX ADMIN — OXYCODONE HYDROCHLORIDE AND ACETAMINOPHEN 1 TABLET: 5; 325 TABLET ORAL at 18:07

## 2018-10-31 RX ADMIN — FENTANYL CITRATE 50 MCG: 50 INJECTION, SOLUTION INTRAMUSCULAR; INTRAVENOUS at 11:33

## 2018-10-31 RX ADMIN — Medication 2 G: at 08:53

## 2018-10-31 RX ADMIN — FENTANYL CITRATE 50 MCG: 50 INJECTION, SOLUTION INTRAMUSCULAR; INTRAVENOUS at 11:06

## 2018-10-31 RX ADMIN — GABAPENTIN 300 MG: 300 CAPSULE ORAL at 20:16

## 2018-10-31 RX ADMIN — OXYCODONE AND ACETAMINOPHEN 2 TABLET: 5; 325 TABLET ORAL at 13:46

## 2018-10-31 RX ADMIN — OXYCODONE HYDROCHLORIDE 10 MG: 5 TABLET ORAL at 08:52

## 2018-10-31 RX ADMIN — PHENYLEPHRINE HYDROCHLORIDE 50 MCG: 10 INJECTION, SOLUTION INTRAMUSCULAR; INTRAVENOUS; SUBCUTANEOUS at 10:28

## 2018-10-31 RX ADMIN — TRANEXAMIC ACID 1000 G: 1 INJECTION, SOLUTION INTRAVENOUS at 09:06

## 2018-10-31 RX ADMIN — DEXAMETHASONE SODIUM PHOSPHATE 8 MG: 4 INJECTION, SOLUTION INTRAMUSCULAR; INTRAVENOUS at 09:38

## 2018-10-31 RX ADMIN — ROCURONIUM BROMIDE 5 MG: 10 INJECTION INTRAVENOUS at 09:06

## 2018-10-31 RX ADMIN — Medication 2 G: at 17:35

## 2018-10-31 RX ADMIN — ONDANSETRON 4 MG: 2 INJECTION INTRAMUSCULAR; INTRAVENOUS at 09:38

## 2018-10-31 RX ADMIN — SUCCINYLCHOLINE CHLORIDE 120 MG: 20 INJECTION, SOLUTION INTRAMUSCULAR; INTRAVENOUS at 09:07

## 2018-10-31 RX ADMIN — SUGAMMADEX 200 MG: 100 INJECTION, SOLUTION INTRAVENOUS at 10:35

## 2018-10-31 RX ADMIN — PHENYLEPHRINE HYDROCHLORIDE 50 MCG: 10 INJECTION, SOLUTION INTRAMUSCULAR; INTRAVENOUS; SUBCUTANEOUS at 10:04

## 2018-10-31 RX ADMIN — PROPOFOL 100 MG: 10 INJECTION, EMULSION INTRAVENOUS at 09:07

## 2018-10-31 RX ADMIN — GABAPENTIN 300 MG: 300 CAPSULE ORAL at 13:46

## 2018-10-31 ASSESSMENT — PULMONARY FUNCTION TESTS
PIF_VALUE: 18
PIF_VALUE: 17
PIF_VALUE: 17
PIF_VALUE: 18
PIF_VALUE: 19
PIF_VALUE: 14
PIF_VALUE: 13
PIF_VALUE: 19
PIF_VALUE: 18
PIF_VALUE: 21
PIF_VALUE: 4
PIF_VALUE: 35
PIF_VALUE: 13
PIF_VALUE: 18
PIF_VALUE: 21
PIF_VALUE: 18
PIF_VALUE: 20
PIF_VALUE: 15
PIF_VALUE: 18
PIF_VALUE: 18
PIF_VALUE: 19
PIF_VALUE: 18
PIF_VALUE: 18
PIF_VALUE: 0
PIF_VALUE: 18
PIF_VALUE: 3
PIF_VALUE: 3
PIF_VALUE: 19
PIF_VALUE: 18
PIF_VALUE: 18
PIF_VALUE: 20
PIF_VALUE: 4
PIF_VALUE: 17
PIF_VALUE: 19
PIF_VALUE: 18
PIF_VALUE: 19
PIF_VALUE: 20
PIF_VALUE: 13
PIF_VALUE: 18
PIF_VALUE: 19
PIF_VALUE: 18
PIF_VALUE: 18
PIF_VALUE: 19
PIF_VALUE: 15
PIF_VALUE: 0
PIF_VALUE: 18
PIF_VALUE: 1
PIF_VALUE: 18
PIF_VALUE: 19
PIF_VALUE: 19
PIF_VALUE: 1
PIF_VALUE: 17
PIF_VALUE: 29
PIF_VALUE: 19
PIF_VALUE: 15
PIF_VALUE: 18
PIF_VALUE: 17
PIF_VALUE: 20
PIF_VALUE: 18
PIF_VALUE: 1
PIF_VALUE: 2
PIF_VALUE: 1
PIF_VALUE: 0
PIF_VALUE: 18
PIF_VALUE: 20
PIF_VALUE: 17
PIF_VALUE: 17
PIF_VALUE: 20
PIF_VALUE: 20
PIF_VALUE: 19
PIF_VALUE: 20
PIF_VALUE: 18
PIF_VALUE: 1
PIF_VALUE: 6
PIF_VALUE: 17
PIF_VALUE: 19
PIF_VALUE: 0
PIF_VALUE: 36
PIF_VALUE: 19
PIF_VALUE: 18
PIF_VALUE: 17
PIF_VALUE: 18
PIF_VALUE: 19
PIF_VALUE: 18
PIF_VALUE: 18
PIF_VALUE: 20
PIF_VALUE: 19
PIF_VALUE: 19
PIF_VALUE: 21
PIF_VALUE: 17
PIF_VALUE: 15
PIF_VALUE: 18
PIF_VALUE: 19
PIF_VALUE: 13
PIF_VALUE: 18
PIF_VALUE: 17
PIF_VALUE: 18
PIF_VALUE: 29
PIF_VALUE: 17
PIF_VALUE: 18
PIF_VALUE: 19
PIF_VALUE: 1
PIF_VALUE: 11
PIF_VALUE: 15
PIF_VALUE: 4
PIF_VALUE: 18
PIF_VALUE: 32
PIF_VALUE: 3
PIF_VALUE: 18

## 2018-10-31 ASSESSMENT — PAIN DESCRIPTION - LOCATION
LOCATION: HIP

## 2018-10-31 ASSESSMENT — PAIN DESCRIPTION - ONSET
ONSET: GRADUAL
ONSET: ON-GOING

## 2018-10-31 ASSESSMENT — PAIN DESCRIPTION - ORIENTATION
ORIENTATION: LEFT

## 2018-10-31 ASSESSMENT — PAIN DESCRIPTION - PAIN TYPE
TYPE: SURGICAL PAIN
TYPE: ACUTE PAIN;SURGICAL PAIN
TYPE: SURGICAL PAIN

## 2018-10-31 ASSESSMENT — PAIN DESCRIPTION - FREQUENCY
FREQUENCY: CONTINUOUS

## 2018-10-31 ASSESSMENT — PAIN DESCRIPTION - DESCRIPTORS
DESCRIPTORS: CONSTANT
DESCRIPTORS: ACHING
DESCRIPTORS: CONSTANT

## 2018-10-31 ASSESSMENT — PAIN SCALES - GENERAL
PAINLEVEL_OUTOF10: 0
PAINLEVEL_OUTOF10: 8
PAINLEVEL_OUTOF10: 4
PAINLEVEL_OUTOF10: 7
PAINLEVEL_OUTOF10: 0
PAINLEVEL_OUTOF10: 4
PAINLEVEL_OUTOF10: 7
PAINLEVEL_OUTOF10: 10

## 2018-10-31 ASSESSMENT — PAIN DESCRIPTION - PROGRESSION: CLINICAL_PROGRESSION: GRADUALLY WORSENING

## 2018-10-31 ASSESSMENT — LIFESTYLE VARIABLES: SMOKING_STATUS: 0

## 2018-10-31 ASSESSMENT — PAIN - FUNCTIONAL ASSESSMENT: PAIN_FUNCTIONAL_ASSESSMENT: 0-10

## 2018-10-31 NOTE — CARE COORDINATION
Referral per clinical path. Met with patient and confirmed his plans as per jet class notes  to rehab at [facility] and transfer via their [facility]. [facility] liaison is aware and following and will coordinate transfer upon d/c anticipated tomorrow.    Electronically signed by Jim Basilio on 10/31/2018 at 4:38 PM   #[id number]

## 2018-10-31 NOTE — PROGRESS NOTES
Occupational Therapy   Occupational Therapy Initial Assessment  Date: 10/31/2018   Patient Name: Diana Noland  MRN: [id number]     : 1955    Date of Service: 10/31/2018    Assessment: Pt s/p elective L THR on 10/31/18 with Dr Zora Bullard. Pt is WBAT with anterolateral hip precautions. Prior to admission, pt lived alone and was independent with self-care and Mod I for fxl mobility with rollator. Pt is now below this baseline, being most limited by decreased balance, activity tolerance, and ROM secondary to anterolateral hip precautions. Pt will benefit from continued therapy while in the hospital in order to maximize function. Recommend continued therapy at discharge. Discharge Recommendations:  Patient would benefit from continued therapy after discharge, 3-5 sessions per week     Diana Noland scored a 17/24 on the AM-PAC ADL Inpatient form. Current research shows that an AM-PAC score of 17 or less is typically not associated with a discharge to the patient's home setting. Based on the patients AM-PAC score and their current ADL deficits, it is recommended that the patient have 3-5 sessions per week of Occupational Therapy at d/c to increase the patients independence. Patient Diagnosis(es): There were no encounter diagnoses. has a past medical history of Arthritis and HIV (human immunodeficiency virus infection) ([facility] [address] has a past surgical history that includes Finger surgery (Left); Colonoscopy; joint replacement (Right, 2018); and pr total hip arthroplasty (Left, 10/31/2018).            Restrictions  Restrictions/Precautions  Restrictions/Precautions: Weight Bearing, Fall Risk  Lower Extremity Weight Bearing Restrictions  Left Lower Extremity Weight Bearing: Weight Bearing As Tolerated  Position Activity Restriction  Hip Precautions: No ADduction, No hip extension, No hip external rotation, No hip flexion > 90 degrees    Subjective   General  Chart Reviewed: Yes  Patient assessed for order to maximize function. Recommend continued therapy at discharge. Prognosis: Good  Decision Making: Low Complexity  History: Pt is from home where he lives alone. He was independent with self-care and Mod I for fxl mobility with rollator. Now admitted with L THR and is WBAT with anterolateral hip precautions. Exam: decreased balance, decreased activity tolerance, decreased ROM  Assistance / Modification: SBA bed mobility, SBA/CGA fxl transfers, CGA fxl mobility with RW, Mod A LB dressing, CGA/SBA toileting and grooming in standing. PMH includes: R THR, Arthritis, HIV  Patient Education: Role of OT, POC, transfer training, hip precautions  REQUIRES OT FOLLOW UP: Yes  Activity Tolerance  Activity Tolerance: Patient Tolerated treatment well  Safety Devices  Safety Devices in place: Yes (RNLeslie, notified)  Type of devices: Call light within reach; Chair alarm in place;Gait belt;Patient at risk for falls;Nurse notified; Left in chair         Plan   Plan  Times per week: 3-5  Times per day: Daily  Current Treatment Recommendations: Strengthening, Endurance Training, Safety Education & Training, Self-Care / ADL, Equipment Evaluation, Education, & procurement, Patient/Caregiver Education & Training    G-Code  OT G-codes  Functional Assessment Tool Used: [facility] ADL   Score: 17  Functional Limitation: Self care  Self Care Current Status (): At least 40 percent but less than 60 percent impaired, limited or restricted  Self Care Goal Status ():  At least 20 percent but less than 40 percent impaired, limited or restricted    AM-PAC Score  AM-[facility] Inpatient Daily Activity Raw Score: 17  AM-PAC Inpatient ADL T-Scale Score : 37.26  ADL Inpatient CMS 0-100% Score: 50.11  ADL Inpatient CMS G-Code Modifier : CK    Goals  Short term goals  Time Frame for Short term goals: By d/c:  Short term goal 1: Pt will complete fxl mobility and fxl transfers to/from ADL surfaces with SBA and use of AD  Short term goal 2: Pt will

## 2018-11-01 ENCOUNTER — TELEPHONE (OUTPATIENT)
Dept: INTERNAL MEDICINE CLINIC | Age: 63
End: 2018-11-01

## 2018-11-01 VITALS
TEMPERATURE: 98.2 F | DIASTOLIC BLOOD PRESSURE: 81 MMHG | OXYGEN SATURATION: 94 % | HEART RATE: 76 BPM | HEIGHT: 67 IN | WEIGHT: 191.14 LBS | SYSTOLIC BLOOD PRESSURE: 128 MMHG | BODY MASS INDEX: 30 KG/M2 | RESPIRATION RATE: 16 BRPM

## 2018-11-01 LAB
GLUCOSE BLD-MCNC: 130 MG/DL (ref 70–99)
GLUCOSE BLD-MCNC: 87 MG/DL (ref 70–99)
HCT VFR BLD CALC: 31.1 % (ref 40.5–52.5)
HEMOGLOBIN: 10.5 G/DL (ref 13.5–17.5)
PERFORMED ON: ABNORMAL
PERFORMED ON: NORMAL

## 2018-11-01 PROCEDURE — 2580000003 HC RX 258: Performed by: ORTHOPAEDIC SURGERY

## 2018-11-01 PROCEDURE — 97535 SELF CARE MNGMENT TRAINING: CPT

## 2018-11-01 PROCEDURE — 94760 N-INVAS EAR/PLS OXIMETRY 1: CPT

## 2018-11-01 PROCEDURE — 85018 HEMOGLOBIN: CPT

## 2018-11-01 PROCEDURE — 6370000000 HC RX 637 (ALT 250 FOR IP): Performed by: ORTHOPAEDIC SURGERY

## 2018-11-01 PROCEDURE — 6360000002 HC RX W HCPCS: Performed by: ORTHOPAEDIC SURGERY

## 2018-11-01 PROCEDURE — 97530 THERAPEUTIC ACTIVITIES: CPT

## 2018-11-01 PROCEDURE — 85014 HEMATOCRIT: CPT

## 2018-11-01 RX ORDER — ASPIRIN 81 MG/1
81 TABLET ORAL 2 TIMES DAILY
Qty: 60 TABLET | Refills: 0 | Status: SHIPPED | OUTPATIENT
Start: 2018-11-01 | End: 2018-12-01

## 2018-11-01 RX ORDER — OXYCODONE HYDROCHLORIDE AND ACETAMINOPHEN 5; 325 MG/1; MG/1
1-2 TABLET ORAL EVERY 4 HOURS PRN
Qty: 60 TABLET | Refills: 0 | Status: SHIPPED | OUTPATIENT
Start: 2018-11-01 | End: 2018-11-19 | Stop reason: SDUPTHER

## 2018-11-01 RX ORDER — GABAPENTIN 300 MG/1
300 CAPSULE ORAL 3 TIMES DAILY
Qty: 30 CAPSULE | Refills: 0 | Status: SHIPPED | OUTPATIENT
Start: 2018-11-01 | End: 2018-11-11

## 2018-11-01 RX ADMIN — GABAPENTIN 300 MG: 300 CAPSULE ORAL at 08:06

## 2018-11-01 RX ADMIN — DOCUSATE SODIUM 100 MG: 100 CAPSULE, LIQUID FILLED ORAL at 08:06

## 2018-11-01 RX ADMIN — Medication 2 G: at 00:11

## 2018-11-01 RX ADMIN — OXYCODONE AND ACETAMINOPHEN 2 TABLET: 5; 325 TABLET ORAL at 05:54

## 2018-11-01 RX ADMIN — SODIUM CHLORIDE: 4.5 INJECTION, SOLUTION INTRAVENOUS at 00:11

## 2018-11-01 RX ADMIN — OXYCODONE AND ACETAMINOPHEN 2 TABLET: 5; 325 TABLET ORAL at 11:42

## 2018-11-01 ASSESSMENT — PAIN DESCRIPTION - LOCATION
LOCATION: HIP

## 2018-11-01 ASSESSMENT — PAIN DESCRIPTION - FREQUENCY
FREQUENCY: CONTINUOUS

## 2018-11-01 ASSESSMENT — PAIN DESCRIPTION - ORIENTATION
ORIENTATION: LEFT

## 2018-11-01 ASSESSMENT — PAIN DESCRIPTION - ONSET
ONSET: ON-GOING
ONSET: GRADUAL

## 2018-11-01 ASSESSMENT — PAIN SCALES - GENERAL
PAINLEVEL_OUTOF10: 9
PAINLEVEL_OUTOF10: 2
PAINLEVEL_OUTOF10: 5
PAINLEVEL_OUTOF10: 8
PAINLEVEL_OUTOF10: 8
PAINLEVEL_OUTOF10: 5
PAINLEVEL_OUTOF10: 5

## 2018-11-01 ASSESSMENT — PAIN DESCRIPTION - PROGRESSION
CLINICAL_PROGRESSION: GRADUALLY IMPROVING
CLINICAL_PROGRESSION: GRADUALLY WORSENING

## 2018-11-01 ASSESSMENT — PAIN DESCRIPTION - DESCRIPTORS
DESCRIPTORS: ACHING

## 2018-11-01 ASSESSMENT — PAIN DESCRIPTION - PAIN TYPE
TYPE: SURGICAL PAIN

## 2018-11-01 NOTE — PLAN OF CARE
injury  Absence of physical injury   Outcome: Ongoing  Absence of physical injury.   Electronically signed by Jose Monzon RN on 11/1/2018 at 7:49 AM

## 2018-11-01 NOTE — OP NOTE
reamed down to accept a #58 uncemented  cup. This was hammered into place in the appropriate amount of  anteversion and abduction. There were no screws used to augment the fixation of the acetabular component. The 10-degree edge space liner was placed in  the anterior position, 40 mm inner diameter. Next, the proximal femur was exposed and then rasped to accept a #4 high  offset Actis stem. Trial reduction with #4 high offset Actis stem with  the 1.5, 40-mm head segment was performed. The hip was reduced and with  the anterior wall liner is very stable and high degrees of flexion and  internal rotation and also extension and external rotation. An  intraoperative AP pelvis x-ray was obtained and showed good position of  the hardware and stable hip arthroplasty. Next, the hip was dislocated. The trial implants were removed, and then  the real #4 high offset Actis stem was hammered into place in the  appropriate amount of anteversion. There was good solid fit. The 1.5  mm, 40-mm metal head segment was place over the Daniel Yong taper. The hip was  reduced and taken through a full and stable range of motion. The wound was irrigated out. Hemostasis was obtained. The wound was  injected with 100 mL of ropivacaine, morphine, cefuroxime, and  methylprednisolone. The abductors were reattached to the greater  trochanter with #5 nonabsorbable Ti-Cron suture and the rest of the  wound was closed in layers after it was bathed in 3 gm of tranexamic  acid and also aqueous iodine solution. The wound was closed in layers. A dressing was applied. The patient was brought to recovery room in  stable condition.         Ainsley Fagan MD    D: 10/31/2018 10:44:00       T: 10/31/2018 14:23:12     [email or web address]  Job#: [id number]     Doc#: [id number]    CC:

## 2018-11-09 ENCOUNTER — TELEPHONE (OUTPATIENT)
Dept: INTERNAL MEDICINE CLINIC | Age: 63
End: 2018-11-09

## 2018-11-09 DIAGNOSIS — M16.12 ARTHRITIS OF LEFT HIP: ICD-10-CM

## 2018-11-09 RX ORDER — OXYCODONE HYDROCHLORIDE AND ACETAMINOPHEN 5; 325 MG/1; MG/1
1-2 TABLET ORAL EVERY 4 HOURS PRN
Qty: 60 TABLET | Status: CANCELLED | OUTPATIENT
Start: 2018-11-09 | End: 2018-11-16

## 2018-11-15 ENCOUNTER — OFFICE VISIT (OUTPATIENT)
Dept: ORTHOPEDIC SURGERY | Age: 63
End: 2018-11-15

## 2018-11-15 VITALS — TEMPERATURE: 97.9 F | WEIGHT: 198 LBS | HEIGHT: 67 IN | BODY MASS INDEX: 31.08 KG/M2

## 2018-11-15 DIAGNOSIS — Z96.642 HISTORY OF TOTAL HIP REPLACEMENT, LEFT: Primary | ICD-10-CM

## 2018-11-15 PROCEDURE — 99024 POSTOP FOLLOW-UP VISIT: CPT | Performed by: ORTHOPAEDIC SURGERY

## 2018-11-15 NOTE — PROGRESS NOTES
This dictation was done with efectivoxon dictation and may contain mechanical errors related to translation. Temperature 97.9 °F (36.6 °C), height 5' 6.5\" (1.689 m), weight 198 lb (89.8 kg).

## 2018-11-19 ENCOUNTER — TELEPHONE (OUTPATIENT)
Dept: ORTHOPEDIC SURGERY | Age: 63
End: 2018-11-19

## 2018-11-19 DIAGNOSIS — Z96.642 H/O TOTAL HIP ARTHROPLASTY, LEFT: Primary | ICD-10-CM

## 2018-11-19 DIAGNOSIS — M16.12 ARTHRITIS OF LEFT HIP: ICD-10-CM

## 2018-11-19 RX ORDER — OXYCODONE HYDROCHLORIDE AND ACETAMINOPHEN 5; 325 MG/1; MG/1
1 TABLET ORAL EVERY 4 HOURS PRN
Qty: 42 TABLET | Refills: 0 | Status: SHIPPED | OUTPATIENT
Start: 2018-11-19 | End: 2018-11-28 | Stop reason: SDUPTHER

## 2018-11-19 NOTE — PROGRESS NOTES
Patient is a 58-year-old male status post left total hip arthroplasty performed through a direct lateral approach on 10/31/2018. Patient states his pain is rating it at a 6 out of 10 however he has significant relief from the arthroplasty. He had severe end-stage bone-on-bone degenerative osteoarthritis of the left hip. Physical examination 58-year-old male oriented ×3 temperature is 97.9. Examination of the left hip shows a healing left lateral hip incision with no signs of obvious deep sepsis. Decreased range of motion of the left hip but no signs of instability or deep sepsis noted. Distal neurovascular examination is intact to the left foot and ankle. X-rays obtained AP pelvis AP lateral of the left hip show status post uncemented left total hip arthroplasty. Stable overall orientation and alignment with no signs of obvious loosening failure or subsidence or instability. 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 Stable overall alignment with no signs of loosening failure or instability. No other obvious fractures tumors or dislocations are seen on these x-rays. Impression 58-year-old male stable status post left total hip arthroplasty. Plan continue physical therapy follow-up in 6-8 weeks or p.r.n.

## 2018-11-19 NOTE — TELEPHONE ENCOUNTER
Patient calling to obtain refill of Percocet.     Pharmacy:  [facility]: [phone]    Please call patient when complete:  [phone]

## 2018-11-28 ENCOUNTER — TELEPHONE (OUTPATIENT)
Dept: ORTHOPEDIC SURGERY | Age: 63
End: 2018-11-28

## 2018-11-28 ENCOUNTER — HOSPITAL ENCOUNTER (OUTPATIENT)
Dept: PHYSICAL THERAPY | Age: 63
Setting detail: THERAPIES SERIES
Discharge: HOME | End: 2018-11-28
Payer: MEDICARE

## 2018-11-28 DIAGNOSIS — Z96.642 H/O TOTAL HIP ARTHROPLASTY, LEFT: ICD-10-CM

## 2018-11-28 PROCEDURE — G8978 MOBILITY CURRENT STATUS: HCPCS

## 2018-11-28 PROCEDURE — 97140 MANUAL THERAPY 1/> REGIONS: CPT

## 2018-11-28 PROCEDURE — G8979 MOBILITY GOAL STATUS: HCPCS

## 2018-11-28 PROCEDURE — 97161 PT EVAL LOW COMPLEX 20 MIN: CPT

## 2018-11-28 PROCEDURE — 97110 THERAPEUTIC EXERCISES: CPT

## 2018-11-28 ASSESSMENT — PAIN DESCRIPTION - DESCRIPTORS: DESCRIPTORS: BURNING;SHOOTING

## 2018-11-28 ASSESSMENT — PAIN DESCRIPTION - LOCATION: LOCATION: HIP

## 2018-11-28 ASSESSMENT — PAIN DESCRIPTION - ORIENTATION: ORIENTATION: LEFT

## 2018-11-28 ASSESSMENT — PAIN DESCRIPTION - FREQUENCY: FREQUENCY: INTERMITTENT

## 2018-11-28 NOTE — FLOWSHEET NOTE
walker  · Scar: Incision healing with no signs of infection  Test measurements:     ROM:  Date              Hip Flexion Hip Extension Hip Abduction Hip IR Hip ER   Eval  11/28 80  20 NT 15             Strength:  Date Hip flexion Hip abduction Hip Extension Hip IR Hip ER Quad Hamstring Ankle DF Ankle PF   Eval 11/28 3/5 2/5 NT 3/5 3/5 4/5 4/5 4/5 4-/5                   Exercises:  Exercise/Equipment Resistance/Repetitions Other comments     Lateral hip precautions   Nu step Add     L hip flexor step stretch add    Mini squats add    Stand HR add    Stand hip flexion                  Abduction                  extension Add  Add  add         Seated FAQ add    Seated Tband HS curls add          SLR flex 0# 5x L with mod-max A    Supine hip abd 0# 10x L    bridge 10x    Add set with ball  10x 3 sec hold    Heel slides 10x         STM L distal ITB x8 mins     CP after exercises x10 mins B hip and L knee      Other Therapeutic Activities:  Pt was educated on PT POC, Diagnosis, Prognosis, pathomechanics as well as frequency and duration of scheduling future physical therapy appointments. Time was also taken on this day to answer all patient questions and participation in PT. Reviewed appointment policy in detail with patient and patient verbalized understanding. Home Exercise Program:  Patient instructed in the following for HEP: heelslides, supine hip abd, bridge, and add set. Patient verbalized/demonstrated understanding and was issued written handout.     Manual Treatments:  STM as stated above    Modalities:  CP after exercises    Timed Code Treatment Minutes:   25    Total Treatment Minutes:  60    Treatment/Activity Tolerance:  [] Patient tolerated treatment well [] Patient limited by fatigue  [x] Patient limited by pain  [] Patient limited by other medical complications  [] Other:     Prognosis: [x] Good [] Fair  [] Poor    Goals:    Short term goals  Time Frame for Short term goals: 3 weeks  Short term goal 1:

## 2018-11-28 NOTE — PLAN OF CARE
Outpatient Physical Therapy  [x] [facility]    Phone: [phone]   Fax: [phone]   [] [facility]  Phone: [phone]              Fax: [phone]  [] [facility]   Phone: [phone]   Fax: [phone]     To: Referring Practitioner: Collette Fox MD      Patient: Harjeet Palma   : 1955   MRN: [id number]  Evaluation Date: 2018      Diagnosis Information:  · Diagnosis: L hip OA   · Treatment Diagnosis: Decreased mobility, strength, gait     Physical Therapy Certification/Re-Certification Form  Dear Dr. Peace Trejo,   The following patient has been evaluated for physical therapy services and for therapy to continue, Medicare requires monthly physician review of the treatment plan. Please review the attached evaluation and/or summary of the patient's plan of care, and verify that you agree therapy should continue by signing the attached document and sending it back to our office. Plan of Care/Treatment to date:  [x] Therapeutic Exercise    [x] Modalities:  [x] Therapeutic Activity     [x] Ultrasound  [x] Electrical Stimulation  [] Gait Training      [] Cervical Traction [] Lumbar Traction  [] Neuromuscular Re-education    [x] Cold/hotpack [] Iontophoresis   [x] Instruction in HEP     Other:  [x] Manual Therapy      []             [] Aquatic Therapy      []           ? Frequency/Duration:  # Days per week: [] 1 day # Weeks: [] 1 week [] 5 weeks     [] 2 days? [] 2 weeks [x] 6 weeks     [x] 3 days   [] 3 weeks [] 7 weeks     [] 4 days   [] 4 weeks [] 8 weeks    Rehab Potential: [x] Excellent [] Good [] Fair  [] Poor      Electronically signed by:  Susan Warren, [id number] [facility]      If you have any questions or concerns, please don't hesitate to call.   Thank you for your referral.      Physician Signature:________________________________Date:__________________  By signing above, therapists plan is approved by physician

## 2018-11-29 RX ORDER — OXYCODONE HYDROCHLORIDE AND ACETAMINOPHEN 5; 325 MG/1; MG/1
1 TABLET ORAL EVERY 4 HOURS PRN
Qty: 42 TABLET | Refills: 0 | Status: SHIPPED | OUTPATIENT
Start: 2018-11-29 | End: 2018-12-06 | Stop reason: SDUPTHER

## 2018-11-30 ENCOUNTER — HOSPITAL ENCOUNTER (OUTPATIENT)
Dept: PHYSICAL THERAPY | Age: 63
Setting detail: THERAPIES SERIES
Discharge: HOME | End: 2018-11-30
Payer: MEDICARE

## 2018-11-30 PROCEDURE — 97110 THERAPEUTIC EXERCISES: CPT

## 2018-11-30 NOTE — FLOWSHEET NOTE
Physical Therapy Daily Treatment Note  Date:  2018    Patient Name:  Elida Severs    :  1955  MRN: [id number]    Restrictions/Precautions:  Fall Risk (low: pt denies any history of frequent falls)  Position Activity Restriction  Hip Precautions: No hip flexion > 90 degrees, No ADduction, No hip internal rotation    Pertinent Medical History: OA HIV, R THR ()    Medical/Treatment Diagnosis Information:  · Diagnosis: L hip OA, s/p L THR  · Treatment Diagnosis: Decreased mobility, strength, gait    Insurance/Certification information:  PT Insurance Information: ElinaLoretta Crumpzahirakrupa 48 Dual  Physician Information:  Referring Practitioner: Yoni Gomez MD  Plan of care signed (Y/N): sent for cosign  (received)      Visit# / total visits:    Pain level: 2-7/10     G-Code (if applicable):      Date / Visit # G-Code Applied:  /  PT G-Codes  Functional Assessment Tool Used: LEFS  Score:  = 28.8% confidence, 71.2% disability  Functional Limitation: Mobility: Walking and moving around  Mobility: Walking and Moving Around Current Status (): At least 60 percent but less than 80 percent impaired, limited or restricted  Mobility: Walking and Moving Around Goal Status (): At least 1 percent but less than 20 percent impaired, limited or restricted    Progress Note: []  Yes  [x]  No  Next due by: Visit #10      History of Injury: Patient reports no complications with L THR on 10/31/18. Patient was discharged to [facility] for 13 days of rehab. Patient was then discharged to home from [facility] where he received home PT. Patient continues to perform HEP at home. Patient currently ambulating with rolling walker in the community and in the home. Occassionally ambulates a few steps without AD at home. Subjective:  Hip is tight this morning. Notes he did his home exercises to music yesterday and thinks he overdid it.      Objective:  Observation:   · Palpation: slight TTP lateral hip

## 2018-12-05 ENCOUNTER — HOSPITAL ENCOUNTER (OUTPATIENT)
Dept: PHYSICAL THERAPY | Age: 63
Setting detail: THERAPIES SERIES
Discharge: HOME | End: 2018-12-05
Payer: MEDICARE

## 2018-12-05 PROCEDURE — 97110 THERAPEUTIC EXERCISES: CPT

## 2018-12-06 ENCOUNTER — TELEPHONE (OUTPATIENT)
Dept: ORTHOPEDIC SURGERY | Age: 63
End: 2018-12-06

## 2018-12-06 DIAGNOSIS — Z96.642 H/O TOTAL HIP ARTHROPLASTY, LEFT: ICD-10-CM

## 2018-12-06 RX ORDER — OXYCODONE HYDROCHLORIDE AND ACETAMINOPHEN 5; 325 MG/1; MG/1
1 TABLET ORAL EVERY 6 HOURS PRN
Qty: 28 TABLET | Refills: 0 | Status: SHIPPED | OUTPATIENT
Start: 2018-12-06 | End: 2018-12-19 | Stop reason: SDUPTHER

## 2018-12-07 ENCOUNTER — HOSPITAL ENCOUNTER (OUTPATIENT)
Dept: PHYSICAL THERAPY | Age: 63
Setting detail: THERAPIES SERIES
Discharge: HOME | End: 2018-12-07
Payer: MEDICARE

## 2018-12-07 PROCEDURE — 97110 THERAPEUTIC EXERCISES: CPT

## 2018-12-07 PROCEDURE — 97140 MANUAL THERAPY 1/> REGIONS: CPT

## 2018-12-07 NOTE — FLOWSHEET NOTE
lateral hip around incision  · Observation: Patient ambulates with trendelenburg gait without AD. Improved with use of rolling walker  · Scar: Incision healing with no signs of infection  Test measurements:     ROM:  Date              Hip Flexion Hip Extension Hip Abduction Hip IR Hip ER   Eval  11/28 80  20 NT 15             Strength:  Date Hip flexion Hip abduction Hip Extension Hip IR Hip ER Quad Hamstring Ankle DF Ankle PF   Eval 11/28 3/5 2/5 NT 3/5 3/5 4/5 4/5 4/5 4-/5                   Exercises:  Exercise/Equipment Resistance/Repetitions Other comments     Lateral hip precautions L   Nu step L1 x5 mins    L hip flexor step stretch 3x20 sec    Mini squats 10x    Stand HR 15x    Stand hip flexion                  Abduction                  extension 0# 10x L/R  0# 10x L/R  0# 10x L/R    Step up fwd               Lat 4\" x 10 L  add         Seated FAQ 3# 2x10 L    Seated Tband HS curls Green 2x10 L          SLR flex 0# 5x L with mod-max A    Supine hip abd 0# 2x10 L    bridge 10x    Add set with ball  10x 3 sec hold    Heel slides 10x L         Hip PROM flex, ER 5x each     STM L distal ITB x8 mins     CP after exercises x10 mins B hip and L knee      Other Therapeutic Activities:  Pt was educated on PT POC, Diagnosis, Prognosis, pathomechanics as well as frequency and duration of scheduling future physical therapy appointments. Time was also taken on this day to answer all patient questions and participation in PT. Reviewed appointment policy in detail with patient and patient verbalized understanding. Home Exercise Program:  Patient instructed in the following for HEP: heelslides, supine hip abd, bridge, and add set. Patient verbalized/demonstrated understanding and was issued written handout.     Manual Treatments:  STM as stated above    Modalities:  CP after exercises    Timed Code Treatment Minutes:   40    Total Treatment Minutes:  50    Treatment/Activity Tolerance:  [x] Patient tolerated treatment

## 2018-12-10 ENCOUNTER — HOSPITAL ENCOUNTER (OUTPATIENT)
Dept: PHYSICAL THERAPY | Age: 63
Setting detail: THERAPIES SERIES
Discharge: HOME | End: 2018-12-10
Payer: MEDICARE

## 2018-12-10 PROCEDURE — 97140 MANUAL THERAPY 1/> REGIONS: CPT

## 2018-12-10 PROCEDURE — 97110 THERAPEUTIC EXERCISES: CPT

## 2018-12-10 NOTE — FLOWSHEET NOTE
Physical Therapy Daily Treatment Note  Date:  12/10/2018    Patient Name:  Luda Rincon    :  1955  MRN: [id number]    Restrictions/Precautions:  low: pt denies any history of frequent falls  Hip Precautions: No hip flexion > 90 degrees, No ADduction, No hip internal rotation    Pertinent Medical History: OA,  HIV, R THR ()    Medical/Treatment Diagnosis Information:  · Diagnosis: L hip OA, s/p L THR  · Treatment Diagnosis: Decreased mobility, strength, gait    Insurance/Certification information:  PT Insurance Information: Mateo Theronzahirakrupa 48 Dual  Physician Information:  Referring Practitioner: Theadora Scheuermann, MD   F/U 18  Plan of care signed (Y/N): sent for cosign  (received)      Visit# / total visits:     Pain level: 1/10     G-Code (if applicable):      Date / Visit # G-Code Applied:  /  PT G-Codes  Functional Assessment Tool Used: LEFS  Score:  = 28.8% confidence, 71.2% disability  Functional Limitation: Mobility: Walking and moving around  Mobility: Walking and Moving Around Current Status (): At least 60 percent but less than 80 percent impaired, limited or restricted  Mobility: Walking and Moving Around Goal Status (): At least 1 percent but less than 20 percent impaired, limited or restricted    Progress Note: []  Yes  [x]  No  Next due by: Visit #10      History of Injury: Patient reports no complications with L THR on 10/31/18. Patient was discharged to [facility] for 13 days of rehab. Patient was then discharged to home from [facility] where he received home PT. Patient continues to perform HEP at home. Patient currently ambulating with rolling walker in the community and in the home. Occassionally ambulates a few steps without AD at home. Subjective:  Walking with Rolator, occasionally uses st cane.      Objective:  Observation:   · Palpation: slight TTP lateral hip around incision  · Observation: Patient ambulates with trendelenburg gait without

## 2018-12-12 ENCOUNTER — HOSPITAL ENCOUNTER (OUTPATIENT)
Dept: PHYSICAL THERAPY | Age: 63
Setting detail: THERAPIES SERIES
Discharge: HOME | End: 2018-12-12
Payer: MEDICARE

## 2018-12-12 PROCEDURE — 97140 MANUAL THERAPY 1/> REGIONS: CPT

## 2018-12-12 PROCEDURE — 97110 THERAPEUTIC EXERCISES: CPT

## 2018-12-12 NOTE — FLOWSHEET NOTE
tolerated treatment well [] Patient limited by fatigue  [] Patient limited by pain  [] Patient limited by other medical complications  [] Other:     Prognosis: [x] Good [] Fair  [] Poor    Goals:    Short term goals  Time Frame for Short term goals: 3 weeks  Short term goal 1: Patient will be independent with HEP and hip precautions for prevention of reinjury. Short term goal 2: Patient will report 30% improvement with pain for ease with ADLs. Short term goal 3: Patient will be able to ambulate with straight cane in home demonstrating good gait pattern. Long term goals  Time Frame for Long term goals : at discharge  Long term goal 1: Patient will be able to to don/doff shoes and socks independently. Long term goal 2: Increase LE strength to 4+/5 for ease with stairs. Long term goal 3: Patient will be able to ambulate in community with straight cane demonstrating good gait pattern. Long term goal 4: Patient will report 70% improvement with pain for ease with ADLs.      Patient Requires Follow-up: [x] Yes  [] No    Plan:   [x] Continue per plan of care [] Alter current plan (see comments)  [] Plan of care initiated [] Hold pending MD visit [] Discharge    Plan for Next Session:  Add above as stated    Electronically signed by:  Flaquito Deng PTA  [id number]

## 2018-12-14 ENCOUNTER — HOSPITAL ENCOUNTER (OUTPATIENT)
Dept: PHYSICAL THERAPY | Age: 63
Setting detail: THERAPIES SERIES
Discharge: HOME | End: 2018-12-14
Payer: MEDICARE

## 2018-12-14 PROCEDURE — 97110 THERAPEUTIC EXERCISES: CPT

## 2018-12-14 NOTE — FLOWSHEET NOTE
Physical Therapy Daily Treatment Note  Date:  2018    Patient Name:  Yesi Davila    :  1955  MRN: [id number]    Restrictions/Precautions:  low: pt denies any history of frequent falls  Hip Precautions: No hip flexion > 90 degrees, No ADduction, No hip internal rotation    Pertinent Medical History: OA,  HIV, R THR ()    Medical/Treatment Diagnosis Information:  · Diagnosis: L hip OA, s/p L THR  · Treatment Diagnosis: Decreased mobility, strength, gait    Insurance/Certification information:  PT Insurance Information: ElinaLoretta Campbell Dual  Physician Information:  Referring Practitioner: Shantel Mayer MD   F/U 18  Plan of care signed (Y/N): sent for cosign  (received)      Visit# / total visits:     Pain level: 1/10     G-Code (if applicable):      Date / Visit # G-Code Applied:  /  PT G-Codes  Functional Assessment Tool Used: LEFS  Score:  = 28.8% confidence, 71.2% disability  Functional Limitation: Mobility: Walking and moving around  Mobility: Walking and Moving Around Current Status (): At least 60 percent but less than 80 percent impaired, limited or restricted  Mobility: Walking and Moving Around Goal Status (): At least 1 percent but less than 20 percent impaired, limited or restricted    Progress Note: []  Yes  [x]  No  Next due by: Visit #10      History of Injury: Patient reports no complications with L THR on 10/31/18. Patient was discharged to [facility] for 13 days of rehab. Patient was then discharged to home from [facility] where he received home PT. Patient continues to perform HEP at home. Patient currently ambulating with rolling walker in the community and in the home. Occassionally ambulates a few steps without AD at home. Subjective:  Seems to be doing pretty good since the weather has been a little warmer.      Objective:  Observation:   · Palpation: slight TTP lateral hip around incision  · Observation: Patient ambulates with trendelenburg gait without AD. Improved with use of rolling walker  · Scar: Incision healing with no signs of infection  Test measurements:     ROM:  Date              Hip Flexion Hip Extension Hip Abduction Hip IR Hip ER   Eval  11/28 80  20 NT 15             Strength:  Date Hip flexion Hip abduction Hip Extension Hip IR Hip ER Quad Hamstring Ankle DF Ankle PF   Eval 11/28 3/5 2/5 NT 3/5 3/5 4/5 4/5 4/5 4-/5                   Exercises:  Exercise/Equipment Resistance/Repetitions Other comments     Lateral hip precautions L   Nu step L2 x5 mins    L hip flexor step stretch 3x20 sec L    Mini squats 10x    Stand HR 20x    Stand hip flexion                  Abduction                  extension 0# 15x L/R  0# 15x L/R  0# 15x L/R Add weight next visit   Step up fwd               Lat 6\" x 10 L  4\" x 10 L    Fitter   abd only 1 thin 10x L/R         FAQ 11# 2x10 B    Seated HS curls 11# 2x10 B          SLR flex 0# 2x5 L with mod A    Supine hip abd 2# 2x10 L    bridge 10x    Add set with ball  10x 5 sec hold     Heel slides 10x L    S-L clam shell 10x L         Gt w/ st cane in dept add    Hip PROM flex, ER 5x each     STM L distal ITB  12/14: held due to         CP after exercises x10 mins B hip and L knee      Other Therapeutic Activities:  Pt was educated on PT POC, Diagnosis, Prognosis, pathomechanics as well as frequency and duration of scheduling future physical therapy appointments. Time was also taken on this day to answer all patient questions and participation in PT. Reviewed appointment policy in detail with patient and patient verbalized understanding. Home Exercise Program:  Patient instructed in the following for HEP: heelslides, supine hip abd, bridge, and add set. Patient verbalized/demonstrated understanding and was issued written handout.     Manual Treatments:  STM as stated above    Modalities:  CP after exercises    Timed Code Treatment Minutes:   40    Total Treatment Minutes: 50    Treatment/Activity Tolerance:  [x] Patient tolerated treatment well [] Patient limited by fatigue  [] Patient limited by pain  [] Patient limited by other medical complications  [] Other:     Prognosis: [x] Good [] Fair  [] Poor    Goals:    Short term goals  Time Frame for Short term goals: 3 weeks  Short term goal 1: Patient will be independent with HEP and hip precautions for prevention of reinjury. Short term goal 2: Patient will report 30% improvement with pain for ease with ADLs. Short term goal 3: Patient will be able to ambulate with straight cane in home demonstrating good gait pattern. Long term goals  Time Frame for Long term goals : at discharge  Long term goal 1: Patient will be able to to don/doff shoes and socks independently. Long term goal 2: Increase LE strength to 4+/5 for ease with stairs. Long term goal 3: Patient will be able to ambulate in community with straight cane demonstrating good gait pattern. Long term goal 4: Patient will report 70% improvement with pain for ease with ADLs.      Patient Requires Follow-up: [x] Yes  [] No    Plan:   [x] Continue per plan of care [] Alter current plan (see comments)  [] Plan of care initiated [] Hold pending MD visit [] Discharge    Plan for Next Session:  Add above as stated    Electronically signed by:  Duncan Benitez, [address]

## 2018-12-17 ENCOUNTER — HOSPITAL ENCOUNTER (OUTPATIENT)
Dept: PHYSICAL THERAPY | Age: 63
Setting detail: THERAPIES SERIES
Discharge: HOME | End: 2018-12-17
Payer: MEDICARE

## 2018-12-17 ENCOUNTER — TELEPHONE (OUTPATIENT)
Dept: ORTHOPEDIC SURGERY | Age: 63
End: 2018-12-17

## 2018-12-17 DIAGNOSIS — Z96.642 H/O TOTAL HIP ARTHROPLASTY, LEFT: ICD-10-CM

## 2018-12-17 PROCEDURE — 97110 THERAPEUTIC EXERCISES: CPT

## 2018-12-17 NOTE — FLOWSHEET NOTE
Treatment Minutes:  50    Treatment/Activity Tolerance:  [x] Patient tolerated treatment well [] Patient limited by fatigue  [] Patient limited by pain  [] Patient limited by other medical complications  [] Other:     Prognosis: [x] Good [] Fair  [] Poor    Goals:    Short term goals  Time Frame for Short term goals: 3 weeks  Short term goal 1: Patient will be independent with HEP and hip precautions for prevention of reinjury. Short term goal 2: Patient will report 30% improvement with pain for ease with ADLs. Short term goal 3: Patient will be able to ambulate with straight cane in home demonstrating good gait pattern. Long term goals  Time Frame for Long term goals : at discharge  Long term goal 1: Patient will be able to to don/doff shoes and socks independently. Long term goal 2: Increase LE strength to 4+/5 for ease with stairs. Long term goal 3: Patient will be able to ambulate in community with straight cane demonstrating good gait pattern. Long term goal 4: Patient will report 70% improvement with pain for ease with ADLs.      Patient Requires Follow-up: [x] Yes  [] No    Plan:   [x] Continue per plan of care [] Alter current plan (see comments)  [] Plan of care initiated [] Hold pending MD visit [] Discharge    Plan for Next Session:  Add above as tolerated    Electronically signed by:  AAN Vasquez

## 2018-12-19 ENCOUNTER — HOSPITAL ENCOUNTER (OUTPATIENT)
Dept: PHYSICAL THERAPY | Age: 63
Setting detail: THERAPIES SERIES
Discharge: HOME | End: 2018-12-19
Payer: MEDICARE

## 2018-12-19 PROCEDURE — 97110 THERAPEUTIC EXERCISES: CPT

## 2018-12-19 RX ORDER — OXYCODONE HYDROCHLORIDE AND ACETAMINOPHEN 5; 325 MG/1; MG/1
1 TABLET ORAL EVERY 6 HOURS PRN
Qty: 28 TABLET | Refills: 0 | Status: SHIPPED | OUTPATIENT
Start: 2018-12-19 | End: 2019-01-02 | Stop reason: SDUPTHER

## 2018-12-20 ENCOUNTER — OFFICE VISIT (OUTPATIENT)
Dept: ORTHOPEDIC SURGERY | Age: 63
End: 2018-12-20

## 2018-12-20 VITALS — TEMPERATURE: 98.4 F | RESPIRATION RATE: 16 BRPM | BODY MASS INDEX: 31.08 KG/M2 | WEIGHT: 198 LBS | HEIGHT: 67 IN

## 2018-12-20 DIAGNOSIS — Z96.642 H/O TOTAL HIP ARTHROPLASTY, LEFT: Primary | ICD-10-CM

## 2018-12-20 PROCEDURE — APPSS15 APP SPLIT SHARED TIME 0-15 MINUTES: Performed by: PHYSICIAN ASSISTANT

## 2018-12-20 PROCEDURE — 99024 POSTOP FOLLOW-UP VISIT: CPT | Performed by: PHYSICIAN ASSISTANT

## 2018-12-21 ENCOUNTER — HOSPITAL ENCOUNTER (OUTPATIENT)
Dept: PHYSICAL THERAPY | Age: 63
Setting detail: THERAPIES SERIES
Discharge: HOME | End: 2018-12-21
Payer: MEDICARE

## 2018-12-21 PROCEDURE — 97110 THERAPEUTIC EXERCISES: CPT

## 2018-12-21 PROCEDURE — G8978 MOBILITY CURRENT STATUS: HCPCS

## 2018-12-21 PROCEDURE — G8979 MOBILITY GOAL STATUS: HCPCS

## 2018-12-21 NOTE — FLOWSHEET NOTE
Physical Therapy Daily Treatment Note  Date:  2018    Patient Name:  Juan Weber    :  1955  MRN: [id number]    Restrictions/Precautions:  low: pt denies any history of frequent falls  Hip Precautions: No hip flexion > 90 degrees, No ADduction, No hip internal rotation    Pertinent Medical History: OA,  HIV, R THR ()    Medical/Treatment Diagnosis Information:  · Diagnosis: L hip OA, s/p L THR  · Treatment Diagnosis: Decreased mobility, strength, gait    Insurance/Certification information:  PT Insurance Information: ElinaLoretta Saldana 48 Dual  Physician Information:  Referring Practitioner: Sherley Dotson MD     Plan of care signed (Y/N): sent for cosign  (received)      Visit# / total visits:  10/18   Pain level: 0/10     G-Code (if applicable):      Date / Visit # G-Code Applied:  /  PT G-Codes  Functional Assessment Tool Used: LEFS  Score: 28/80 = 35% confidence, 62% disability  Functional Limitation: Mobility: Walking and moving around  Mobility: Walking and Moving Around Current Status (): At least 60 percent but less than 80 percent impaired, limited or restricted  Mobility: Walking and Moving Around Goal Status (): At least 1 percent but less than 20 percent impaired, limited or restricted    Progress Note: []  Yes  [x]  No  Next due by: Visit #10      History of Injury: Patient reports no complications with L THR on 10/31/18. Patient was discharged to [facility] for 13 days of rehab. Patient was then discharged to home from [facility] where he received home PT. Patient continues to perform HEP at home. Patient currently ambulating with rolling walker in the community and in the home. Occassionally ambulates a few steps without AD at home. Subjective: Hip was achy yesterday due to rainy weather. Just a little stiff this morning.   Has noticed a little more tightness lately in ITB    Objective:  Observation:   · Palpation: slight TTP lateral hip around

## 2018-12-27 ENCOUNTER — HOSPITAL ENCOUNTER (OUTPATIENT)
Dept: PHYSICAL THERAPY | Age: 63
Setting detail: THERAPIES SERIES
Discharge: HOME | End: 2018-12-27
Payer: MEDICARE

## 2018-12-27 PROCEDURE — 97110 THERAPEUTIC EXERCISES: CPT | Performed by: CHIROPRACTOR

## 2018-12-31 ENCOUNTER — HOSPITAL ENCOUNTER (OUTPATIENT)
Dept: PHYSICAL THERAPY | Age: 63
Setting detail: THERAPIES SERIES
Discharge: HOME | End: 2018-12-31
Payer: MEDICARE

## 2019-01-02 ENCOUNTER — HOSPITAL ENCOUNTER (OUTPATIENT)
Dept: PHYSICAL THERAPY | Age: 64
Setting detail: THERAPIES SERIES
Discharge: HOME | End: 2019-01-02
Payer: MEDICARE

## 2019-01-02 ENCOUNTER — TELEPHONE (OUTPATIENT)
Dept: ORTHOPEDIC SURGERY | Age: 64
End: 2019-01-02

## 2019-01-02 DIAGNOSIS — Z96.642 H/O TOTAL HIP ARTHROPLASTY, LEFT: ICD-10-CM

## 2019-01-02 PROCEDURE — 97110 THERAPEUTIC EXERCISES: CPT

## 2019-01-02 RX ORDER — OXYCODONE HYDROCHLORIDE AND ACETAMINOPHEN 5; 325 MG/1; MG/1
1 TABLET ORAL EVERY 6 HOURS PRN
Qty: 28 TABLET | Refills: 0 | Status: SHIPPED | OUTPATIENT
Start: 2019-01-02 | End: 2019-01-21 | Stop reason: SDUPTHER

## 2019-01-04 ENCOUNTER — HOSPITAL ENCOUNTER (OUTPATIENT)
Dept: PHYSICAL THERAPY | Age: 64
Setting detail: THERAPIES SERIES
Discharge: HOME | End: 2019-01-04
Payer: MEDICARE

## 2019-01-04 PROCEDURE — 97110 THERAPEUTIC EXERCISES: CPT

## 2019-01-07 ENCOUNTER — HOSPITAL ENCOUNTER (OUTPATIENT)
Dept: PHYSICAL THERAPY | Age: 64
Setting detail: THERAPIES SERIES
Discharge: HOME | End: 2019-01-07
Payer: MEDICARE

## 2019-01-07 PROCEDURE — 97110 THERAPEUTIC EXERCISES: CPT

## 2019-01-09 ENCOUNTER — HOSPITAL ENCOUNTER (OUTPATIENT)
Dept: PHYSICAL THERAPY | Age: 64
Setting detail: THERAPIES SERIES
Discharge: HOME | End: 2019-01-09
Payer: MEDICARE

## 2019-01-09 PROCEDURE — 97110 THERAPEUTIC EXERCISES: CPT

## 2019-01-11 ENCOUNTER — HOSPITAL ENCOUNTER (OUTPATIENT)
Dept: PHYSICAL THERAPY | Age: 64
Setting detail: THERAPIES SERIES
Discharge: HOME | End: 2019-01-11
Payer: MEDICARE

## 2019-01-11 PROCEDURE — 97110 THERAPEUTIC EXERCISES: CPT

## 2019-01-14 ENCOUNTER — HOSPITAL ENCOUNTER (OUTPATIENT)
Dept: PHYSICAL THERAPY | Age: 64
Setting detail: THERAPIES SERIES
Discharge: HOME | End: 2019-01-14
Payer: MEDICARE

## 2019-01-14 PROCEDURE — 97110 THERAPEUTIC EXERCISES: CPT

## 2019-01-16 ENCOUNTER — HOSPITAL ENCOUNTER (OUTPATIENT)
Dept: PHYSICAL THERAPY | Age: 64
Setting detail: THERAPIES SERIES
Discharge: HOME | End: 2019-01-16
Payer: MEDICARE

## 2019-01-16 PROCEDURE — 97110 THERAPEUTIC EXERCISES: CPT

## 2019-01-18 ENCOUNTER — HOSPITAL ENCOUNTER (OUTPATIENT)
Dept: PHYSICAL THERAPY | Age: 64
Setting detail: THERAPIES SERIES
Discharge: HOME | End: 2019-01-18
Payer: MEDICARE

## 2019-01-18 PROCEDURE — 97110 THERAPEUTIC EXERCISES: CPT

## 2019-01-21 ENCOUNTER — TELEPHONE (OUTPATIENT)
Dept: ORTHOPEDIC SURGERY | Age: 64
End: 2019-01-21

## 2019-01-21 DIAGNOSIS — Z96.642 H/O TOTAL HIP ARTHROPLASTY, LEFT: ICD-10-CM

## 2019-01-21 RX ORDER — OXYCODONE HYDROCHLORIDE AND ACETAMINOPHEN 5; 325 MG/1; MG/1
1 TABLET ORAL 2 TIMES DAILY
Qty: 14 TABLET | Refills: 0 | Status: SHIPPED | OUTPATIENT
Start: 2019-01-21 | End: 2019-01-28

## 2019-01-23 ENCOUNTER — APPOINTMENT (OUTPATIENT)
Dept: PHYSICAL THERAPY | Age: 64
End: 2019-01-23
Payer: MEDICARE

## 2019-01-25 ENCOUNTER — HOSPITAL ENCOUNTER (OUTPATIENT)
Dept: PHYSICAL THERAPY | Age: 64
Setting detail: THERAPIES SERIES
Discharge: HOME | End: 2019-01-25
Payer: MEDICARE

## 2019-01-25 PROCEDURE — 97110 THERAPEUTIC EXERCISES: CPT

## 2019-01-28 ENCOUNTER — HOSPITAL ENCOUNTER (OUTPATIENT)
Dept: PHYSICAL THERAPY | Age: 64
Setting detail: THERAPIES SERIES
Discharge: HOME | End: 2019-01-28
Payer: MEDICARE

## 2019-01-28 ENCOUNTER — TELEPHONE (OUTPATIENT)
Dept: ORTHOPEDIC SURGERY | Age: 64
End: 2019-01-28

## 2019-01-28 PROCEDURE — G8979 MOBILITY GOAL STATUS: HCPCS

## 2019-01-28 PROCEDURE — G8978 MOBILITY CURRENT STATUS: HCPCS

## 2019-01-28 PROCEDURE — 97110 THERAPEUTIC EXERCISES: CPT

## 2019-01-30 ENCOUNTER — HOSPITAL ENCOUNTER (OUTPATIENT)
Dept: PHYSICAL THERAPY | Age: 64
Setting detail: THERAPIES SERIES
Discharge: HOME | End: 2019-01-30
Payer: MEDICARE

## 2019-01-30 NOTE — FLOWSHEET NOTE
Physical Therapy    Patient Name:  Babs Ingram  :  1955   Date:  2019  Cancelled visits to date: 2  No-shows to date: 1    For today's appointment patient:  [x]  Cancelled  []  Rescheduled appointment  []  No-show     Reason given by patient:  []  Patient ill  []  Conflicting appointment  []  No transportation    []  Conflict with work  [x]  No reason given  []  Other:     Comments:     Electronically signed by:  Minda Wyatt,

## 2019-02-01 ENCOUNTER — HOSPITAL ENCOUNTER (OUTPATIENT)
Dept: PHYSICAL THERAPY | Age: 64
Setting detail: THERAPIES SERIES
Discharge: HOME | End: 2019-02-01
Payer: MEDICARE

## 2019-02-01 PROCEDURE — 97110 THERAPEUTIC EXERCISES: CPT

## 2019-02-04 ENCOUNTER — HOSPITAL ENCOUNTER (OUTPATIENT)
Dept: PHYSICAL THERAPY | Age: 64
Setting detail: THERAPIES SERIES
Discharge: HOME | End: 2019-02-04
Payer: MEDICARE

## 2019-02-04 PROCEDURE — 97110 THERAPEUTIC EXERCISES: CPT

## 2019-02-06 ENCOUNTER — HOSPITAL ENCOUNTER (OUTPATIENT)
Dept: PHYSICAL THERAPY | Age: 64
Setting detail: THERAPIES SERIES
Discharge: HOME | End: 2019-02-06
Payer: MEDICARE

## 2019-02-06 PROCEDURE — 97110 THERAPEUTIC EXERCISES: CPT

## 2019-02-08 ENCOUNTER — HOSPITAL ENCOUNTER (OUTPATIENT)
Dept: PHYSICAL THERAPY | Age: 64
Setting detail: THERAPIES SERIES
Discharge: HOME | End: 2019-02-08
Payer: MEDICARE

## 2019-02-08 PROCEDURE — 97110 THERAPEUTIC EXERCISES: CPT

## 2019-02-11 ENCOUNTER — HOSPITAL ENCOUNTER (OUTPATIENT)
Dept: PHYSICAL THERAPY | Age: 64
Setting detail: THERAPIES SERIES
Discharge: HOME | End: 2019-02-11
Payer: MEDICARE

## 2019-02-11 PROCEDURE — G8979 MOBILITY GOAL STATUS: HCPCS

## 2019-02-11 PROCEDURE — 97110 THERAPEUTIC EXERCISES: CPT

## 2019-02-11 PROCEDURE — G8980 MOBILITY D/C STATUS: HCPCS

## 2019-02-13 ENCOUNTER — APPOINTMENT (OUTPATIENT)
Dept: PHYSICAL THERAPY | Age: 64
End: 2019-02-13
Payer: MEDICARE

## 2019-02-15 ENCOUNTER — APPOINTMENT (OUTPATIENT)
Dept: PHYSICAL THERAPY | Age: 64
End: 2019-02-15
Payer: MEDICARE

## 2019-04-22 ENCOUNTER — OFFICE VISIT (OUTPATIENT)
Dept: ORTHOPEDIC SURGERY | Age: 64
End: 2019-04-22
Payer: MEDICARE

## 2019-04-22 VITALS
HEIGHT: 67 IN | DIASTOLIC BLOOD PRESSURE: 76 MMHG | BODY MASS INDEX: 33.71 KG/M2 | SYSTOLIC BLOOD PRESSURE: 124 MMHG | WEIGHT: 214.8 LBS | HEART RATE: 70 BPM | TEMPERATURE: 97.3 F

## 2019-04-22 DIAGNOSIS — Z96.642 H/O TOTAL HIP ARTHROPLASTY, LEFT: Primary | ICD-10-CM

## 2019-04-22 DIAGNOSIS — M17.12 PRIMARY OSTEOARTHRITIS OF LEFT KNEE: ICD-10-CM

## 2019-04-22 DIAGNOSIS — Z96.641 HISTORY OF TOTAL HIP ARTHROPLASTY, RIGHT: ICD-10-CM

## 2019-04-22 DIAGNOSIS — M25.562 LEFT KNEE PAIN, UNSPECIFIED CHRONICITY: ICD-10-CM

## 2019-04-22 PROCEDURE — G8417 CALC BMI ABV UP PARAM F/U: HCPCS | Performed by: ORTHOPAEDIC SURGERY

## 2019-04-22 PROCEDURE — 4040F PNEUMOC VAC/ADMIN/RCVD: CPT | Performed by: ORTHOPAEDIC SURGERY

## 2019-04-22 PROCEDURE — 1036F TOBACCO NON-USER: CPT | Performed by: ORTHOPAEDIC SURGERY

## 2019-04-22 PROCEDURE — 1123F ACP DISCUSS/DSCN MKR DOCD: CPT | Performed by: ORTHOPAEDIC SURGERY

## 2019-04-22 PROCEDURE — G8427 DOCREV CUR MEDS BY ELIG CLIN: HCPCS | Performed by: ORTHOPAEDIC SURGERY

## 2019-04-22 PROCEDURE — 20610 DRAIN/INJ JOINT/BURSA W/O US: CPT | Performed by: ORTHOPAEDIC SURGERY

## 2019-04-22 PROCEDURE — 3017F COLORECTAL CA SCREEN DOC REV: CPT | Performed by: ORTHOPAEDIC SURGERY

## 2019-04-22 PROCEDURE — 99213 OFFICE O/P EST LOW 20 MIN: CPT | Performed by: ORTHOPAEDIC SURGERY

## 2019-04-22 NOTE — PROGRESS NOTES
This dictation was done with [email or web address] dictation and may contain mechanical errors related to translation. Blood pressure 124/76, pulse 70, temperature 97.3 °F (36.3 °C), temperature source Temporal, height 5' 6.5\" (1.689 m), weight 214 lb 12.8 oz (97.4 kg).       Kenalog:  NDC: T6403074  Lot Number: [id number]  Expiration Date: 4/20

## 2019-05-04 NOTE — PROGRESS NOTES
abductors foot plantar dorsiflexes are intact 4-55. Sensation and perfusion are intact to both right and left lower extremity. There is no numbness or tingling noted in either right or left lower extremity. Deep tendon reflexes are 0 to knee and 0 to ankle of both right and left lower extremity. Examination of both hips shows well-healed lateral scars good range of motion no signs of instability or deep sepsis noted. Examination of the left knee shows loss of full extension by 5-7° with flexion to about 130°. No signs of obvious instability or deep sepsis are noted in the left knee. There is tenderness to palpation of the joint line on both medial and lateral sides. Mild fullness with tenderness to palpation posterior popliteal region noted. X-rays obtained first AP pelvis AP lateral both right and left hip show status post bilateral uncemented total hip arthroplasties. Stable overall orientation and alignment with no signs of obvious loosening subsidence or failure noted. Appears to be good ingrowth noted of both acetabular and femoral components. No other obvious fractures tumors or dislocations are noted on these x-rays. X-rays obtained AP lateral patellofemoral view of the left knee shows tricompartmental degenerative osteoarthritis. Significant narrowing in the lateral tibiofemoral space with sclerosis and osteophyte formation of distal femoral condylar and proximal tibial region. Moderately advanced patellofemoral degenerative disease is noted. No other obvious fractures tumors or dislocations are seen on these x-rays. Impression 68-year-old male stable status post bilateral total hip arthroplasties with left knee arthritis. The left knee is injected with 3 mL of Marcaine and 40 mg of Kenalog.     Plan we had a 15 minute face-to-face discussion with this patient of which greater than 50% of time was spent on counseling about further care and treatment of not only has bilateral hip arthroplasties but also his left knee pain and degenerative osteoarthritis. We suggested continue physical therapy strengthening and range of motion exercises for both hips and also his left knee. We discussed is just that he stop smoking immediately and also and anti-inflammatories to his conservative management. We will see how her response to cortisone injection but more than likely this gentleman will eventually need to undergo knee arthroplasty to control symptoms. We will follow him up in 4-6 weeks or p.r.n.

## 2019-05-14 ENCOUNTER — TELEPHONE (OUTPATIENT)
Dept: ORTHOPEDIC SURGERY | Age: 64
End: 2019-05-14

## 2019-05-14 DIAGNOSIS — Z96.641 HISTORY OF TOTAL HIP ARTHROPLASTY, RIGHT: ICD-10-CM

## 2019-05-14 DIAGNOSIS — Z96.642 H/O TOTAL HIP ARTHROPLASTY, LEFT: Primary | ICD-10-CM

## 2019-05-14 NOTE — TELEPHONE ENCOUNTER
Patient called states that his physical therapist is recommending water therapy. He is calling to obtain a new order for water therapy. He would like to do water therapy at the [facility].     Please call patient when complete:  [phone]

## 2019-05-24 ENCOUNTER — HOSPITAL ENCOUNTER (OUTPATIENT)
Dept: PHYSICAL THERAPY | Age: 64
Setting detail: THERAPIES SERIES
Discharge: HOME | End: 2019-05-24
Payer: MEDICARE

## 2019-05-24 PROCEDURE — 97530 THERAPEUTIC ACTIVITIES: CPT

## 2019-05-24 PROCEDURE — 97161 PT EVAL LOW COMPLEX 20 MIN: CPT

## 2019-05-24 ASSESSMENT — PAIN DESCRIPTION - DESCRIPTORS: DESCRIPTORS: SHARP;SHOOTING

## 2019-05-24 ASSESSMENT — PAIN DESCRIPTION - PROGRESSION: CLINICAL_PROGRESSION: GRADUALLY WORSENING

## 2019-05-24 ASSESSMENT — PAIN - FUNCTIONAL ASSESSMENT: PAIN_FUNCTIONAL_ASSESSMENT: PREVENTS OR INTERFERES WITH MANY ACTIVE NOT PASSIVE ACTIVITIES

## 2019-05-24 ASSESSMENT — PAIN DESCRIPTION - ORIENTATION: ORIENTATION: RIGHT

## 2019-05-24 ASSESSMENT — PAIN DESCRIPTION - LOCATION: LOCATION: HIP

## 2019-05-24 NOTE — FLOWSHEET NOTE
MD visit [] Discharge    Plan for Next Session:  Begin aquatics. Focus on hip abduction strengthening.      Electronically signed by:  Mateo Matthews, [id number]

## 2019-05-24 NOTE — PROGRESS NOTES
Physical Therapy  Initial Assessment  Date: 2019  Patient Name: Manjinder Lockett  MRN: [id number]  : 1955     Treatment Diagnosis: Decreased strength and mobility    Restrictions  Restrictions/Precautions  Restrictions/Precautions: Fall Risk(low: pt denies any history of falls)    Subjective   General  Chart Reviewed: Yes  Patient assessed for rehabilitation services?: Yes  Additional Pertinent Hx: HIV, OA, B THR (, 10/18)  Referring Practitioner: Cassi Caballero MD  Referral Date : 19  Diagnosis: history of B THR, hip pain  PT Visit Information  PT Insurance Information: Mateo Saldana 48 Dual  Total # of Visits Approved: 8  Total # of Visits to Date: 1  Subjective  Subjective: Patient underwent B THR last year. PT ended at the end of the year. Patient used his 30 day free pass with the fitness center as able following PT. Patient notes that if he sneezes he gets intense 10/10 pain in his R groin. Patient states this was going on prior to PT and did not improve with THR. Patient also reports difficulty with lifting leg up into bed and the car. (2017)  Pain Screening  Patient Currently in Pain: Yes  Pain Assessment  Pain Assessment: 0-10  Pain Level: (pain 4/10 with activity, 7/10 with rest)  Pain Location: Hip  Pain Orientation: Right  Pain Descriptors: Ali Gnosticist; Shooting  Clinical Progression: Gradually worsening  Functional Pain Assessment: Prevents or interferes with many active not passive activities  Non-Pharmaceutical Pain Intervention(s): Rest  Vital Signs  Patient Currently in Pain: Yes    Objective     Observation/Palpation  Palpation: mod TTP R greater trochanter  Observation: Patient ambulates without AD demonstrating lateral turnk lean and trendelenberg on R.    Edema: WNL  Scar: incisions healed with no signs of infection    PROM RLE (degrees)  RLE PROM: Exceptions  R Hip Flexion 0-125: 90  R Hip Extension 0-10: 5  R Hip ABduction 0-45: WNL  R Hip External Rotation 0-45: min decreased  R Hip Internal Rotation 0-45: WNL  PROM LLE (degrees)  LLE PROM: Exceptions  L Hip Flexion 0-125: 100  L Hip Extension 0-10: 10  L Hip ABduction 0-45: WNL  L Hip External Rotation 0-45: min decreased  L Hip Internal Rotation 0-45: WNL    Strength RLE  Strength RLE: Exception  R Hip Flexion: 3/5  R Hip Extension: 3/5  R Hip ABduction: 3-/5  R Hip Internal Rotation: 4/5  R Hip External Rotation: 4/5  R Knee Flexion: 5/5  R Knee Extension: 4+/5  R Ankle Dorsiflexion: 5/5  R Ankle Plantar flexion: 5/5  Strength LLE  Strength LLE: Exception  L Hip Flexion: 5/5  L Hip Extension: 4+/5  L Hip ABduction: 4+/5  L Hip Internal Rotation: 5/5  L Hip External Rotation: 5/5  L Knee Flexion: 5/5  L Knee Extension: 5/5  L Ankle Dorsiflexion: 5/5  L Ankle Plantar Flexion: 5/5     Additional Measures  Flexibility: B hip flexors = min tightness noted  Special Tests: (+) trendelenburg R  Sensation  Overall Sensation Status: WNL    Assessment   Conditions Requiring Skilled Therapeutic Intervention  Body structures, Functions, Activity limitations: Decreased high-level IADLs;Decreased functional mobility ; Decreased ADL status; Decreased endurance;Decreased ROM; Decreased strength;Decreased balance; Increased Pain  Assessment: Patient reports to PT with decreased strength and mobility which are limiting functional activities and would benefit from skilled PT at this time.  (Prior Level of Function: able to ambulate without AD)  Treatment Diagnosis: Decreased strength and mobility  Prognosis: Excellent  Decision Making: Low Complexity  REQUIRES PT FOLLOW UP: Yes  Activity Tolerance  Activity Tolerance: Patient limited by pain         Plan   Plan  Times per week: 2x/week  Plan weeks: 4 weeks  Current Treatment Recommendations: Strengthening, Manual Therapy - Joint Manipulation, Aquatics, Gait Training, ROM, Pain Management, Modalities, Manual Therapy - Soft Tissue Mobilization, Home Exercise Program    OutComes Score  LEFS Total Score: 48     Goals  Short term goals  Time Frame for Short term goals: 3 weeks  Short term goal 1: Patient will be independent with HEP for prevention of reinjury. Short term goal 2: Patient will report 30% improvement with pain for ease with ADLs. Short term goal 3: Patient will tolerate 45 mins of aquatic exercises without increased pain. Long term goals  Time Frame for Long term goals : at discharge  Long term goal 1: Increase R hip strength to 4/5 for ease with ambulation. Long term goal 2: Patient will report 70% improvement with pain for ease with ADLs. Long term goal 3: Increase R hip flexion PROM to 100 for ease with don/doff shoes. Patient Goals   Patient goals :  \"Be able to walk with less pain\"     Therapy Time   Individual   Time In 1300   Time Out 1350   Minutes 50   Timed Code Treatment Minutes: [address], [id number] [address]

## 2019-05-28 ENCOUNTER — HOSPITAL ENCOUNTER (OUTPATIENT)
Dept: PHYSICAL THERAPY | Age: 64
Setting detail: THERAPIES SERIES
Discharge: HOME | End: 2019-05-28
Payer: MEDICARE

## 2019-05-28 PROCEDURE — 97113 AQUATIC THERAPY/EXERCISES: CPT

## 2019-05-28 NOTE — FLOWSHEET NOTE
Physical Therapy Aquatic Flow Sheet   Date:  2019    Patient Name:  Con Garcia    :  1955    Restrictions/Precautions:    Pertinent Medical History: HIV, OA, B THR (, 10/18)    · Diagnosis:  history of B THR, hip pain    · Treatment Diagnosis:  Decreased strength and mobility    Insurance/Certification information: Mate[employer] Saldana 48 Dual    Referring Physician: Trent Knox MD    Plan of care signed (Y/N):      Visit# / total visits:    Pain level: 0/10 left and 4/10 right      Progress Note: []  Yes  [x]  No  Next due by: Visit #10:      History of Injury: Patient underwent B THR last year. PT ended at the end of the year. Patient used his 30 day free pass with the fitness center as able following PT. Patient notes that if he sneezes he gets intense 10/10 pain in his R groin. Patient states this was going on prior to PT and did not improve with THR. Patient also reports difficulty with lifting leg up into bed and the car. (2017)    Subjective:  My left hip is good and strong but my right hip is still weak. Objective:   Observation:  See eval   Test measurements:      Key  B= Belt DB= Dumbells T= Theratube   G=Gloves N= Noodles W= Weights   P= Paddles WW=Water Walker K= Kickboard     *Pt amb to pool area with st cane. *   Transfers:   steps with bilat handrails, step over step.      % Immersion: 75%            Ambulation:   Exercises UE:      Forwards 3+1 Shoulder Shrugs     Lateral  Shoulder circles     Marching    Scapular Retraction      Retro   Rolling      Cariocas  Push Downs      IR/ER      Punching    Stretching: bilat Rowing    Gastroc/Soleus  20 sec x 3 Elbow Flex/Ext    Hamstring   Shldr Flex/Ext     Knee flex stretch   Shldr Abd/Add    Piriformis   Horiz Abd/Add     Hip flexor    Arm Circles     SKTC   PNF Diagonals    DKTC  UE oscillations f/b, s/s    ITB   Wall Push-ups    Quad  Figure 8's    Mid back   Buoy pull/push downs    UT  Tband rows    Rhom  Tband lats Discussed with pt importance of exercises without increase pain and with proper posture. Pt verbalized understanding. Plan:   [x] Continue per plan of care [] Alter current plan (see comments)  [] Plan of care initiated [] Hold pending MD visit [] Discharge    Plan for Next Session:  Gradually increase gait and exercise with an emphasis on posture, proper exercise tech, and no increase pain. Therapist will educate patient on lumbopelvic stabilization with exercise and ADL's. Add:  Inc forward gt x 4, inc seated exer x 15, noodle balance, leg press as tolerated.     Electronically signed by: Junito Balderas, PTA  [id number]

## 2019-05-30 ENCOUNTER — HOSPITAL ENCOUNTER (OUTPATIENT)
Dept: PHYSICAL THERAPY | Age: 64
Setting detail: THERAPIES SERIES
Discharge: HOME | End: 2019-05-30
Payer: MEDICARE

## 2019-05-30 PROCEDURE — 97113 AQUATIC THERAPY/EXERCISES: CPT

## 2019-05-30 PROCEDURE — 97150 GROUP THERAPEUTIC PROCEDURES: CPT

## 2019-05-30 NOTE — FLOWSHEET NOTE
hip    Patient Education:    Plan:   [x] Continue per plan of care [] Alter current plan (see comments)  [] Plan of care initiated [] Hold pending MD visit [] Discharge    Plan for Next Session:  Gradually increase gait and exercise with an emphasis on posture, proper exercise tech, and no increase pain. Therapist will educate patient on lumbopelvic stabilization with exercise and ADL's. Add:  Inc forward gt x 5, inc seated exer x 20, inc leg press x 15, add lateral gt as tolerated.     Electronically signed by: Ira Scott PTA  [id number]

## 2019-06-04 ENCOUNTER — HOSPITAL ENCOUNTER (OUTPATIENT)
Dept: PHYSICAL THERAPY | Age: 64
Setting detail: THERAPIES SERIES
Discharge: HOME | End: 2019-06-04
Payer: MEDICARE

## 2019-06-04 PROCEDURE — 97150 GROUP THERAPEUTIC PROCEDURES: CPT

## 2019-06-04 PROCEDURE — 97113 AQUATIC THERAPY/EXERCISES: CPT

## 2019-06-04 NOTE — FLOWSHEET NOTE
Physical Therapy Aquatic Flow Sheet   Date:  2019    Patient Name:  Lillie Villegas    :  1955    Restrictions/Precautions:    Pertinent Medical History: HIV, OA, B THR (, 10/18)    · Diagnosis:  history of B THR, hip pain    · Treatment Diagnosis:  Decreased strength and mobility    Insurance/Certification information: Mateo Saldana 48 Dual    Referring Physician: Cruz Thompson MD    Plan of care signed (Y/N):      Visit# / total visits:    Pain level: 0/10 left and 0/10 right      Progress Note: []  Yes  [x]  No  Next due by: Visit #10:      History of Injury: Patient underwent B THR last year. PT ended at the end of the year. Patient used his 30 day free pass with the fitness center as able following PT. Patient notes that if he sneezes he gets intense 10/10 pain in his R groin. Patient states this was going on prior to PT and did not improve with THR. Patient also reports difficulty with lifting leg up into bed and the car. (2017)    Subjective:  My hips are feeling better. Objective:   Observation:  See eval   Test measurements:      Key  B= Belt DB= Dumbells T= Theratube   G=Gloves N= Noodles W= Weights   P= Paddles WW=Water Walker K= Kickboard     *Pt amb to pool area with st cane. *   Transfers:   steps with bilat handrails, step over step.      % Immersion: 75%            Ambulation:   Exercises UE:      Forwards 5+2 Shoulder Shrugs     Lateral 1 Shoulder circles     Marching    Scapular Retraction      Retro   Rolling      Cariocas  Push Downs      IR/ER      Punching    Stretching: bilat Rowing    Gastroc/Soleus  20 sec x 3 Elbow Flex/Ext    Hamstring  20 sec x 3  Shldr Flex/Ext     Knee flex stretch   Shldr Abd/Add    Piriformis   Horiz Abd/Add     Hip flexor    Arm Circles     SKTC   PNF Diagonals    DKTC  UE oscillations f/b, s/s    ITB   Wall Push-ups    Quad  Figure 8's    Mid back   Buoy pull/push downs    UT  Tband rows    Rhom  Tband lats    Post Shoulder  Lumbar Plan:   [x] Continue per plan of care [] Alter current plan (see comments)  [] Plan of care initiated [] Hold pending MD visit [] Discharge    Plan for Next Session:  Gradually increase gait and exercise with an emphasis on posture, proper exercise tech, and no increase pain. Therapist will educate patient on lumbopelvic stabilization with exercise and ADL's. Add:  Inc forward gt x 6, inc seated exer x 30, inc leg press x 20, inc lateral gt x 2 as tolerated.     Electronically signed by: Loki Pedroza, PTA  [id number]

## 2019-06-06 ENCOUNTER — HOSPITAL ENCOUNTER (OUTPATIENT)
Dept: PHYSICAL THERAPY | Age: 64
Setting detail: THERAPIES SERIES
Discharge: HOME | End: 2019-06-06
Payer: MEDICARE

## 2019-06-06 ENCOUNTER — OFFICE VISIT (OUTPATIENT)
Dept: ORTHOPEDIC SURGERY | Age: 64
End: 2019-06-06
Payer: MEDICARE

## 2019-06-06 VITALS
HEART RATE: 63 BPM | RESPIRATION RATE: 16 BRPM | BODY MASS INDEX: 33.59 KG/M2 | SYSTOLIC BLOOD PRESSURE: 123 MMHG | WEIGHT: 214 LBS | DIASTOLIC BLOOD PRESSURE: 80 MMHG | HEIGHT: 67 IN | TEMPERATURE: 97 F

## 2019-06-06 DIAGNOSIS — M17.12 PRIMARY OSTEOARTHRITIS OF LEFT KNEE: Primary | ICD-10-CM

## 2019-06-06 PROCEDURE — 97113 AQUATIC THERAPY/EXERCISES: CPT

## 2019-06-06 PROCEDURE — G8417 CALC BMI ABV UP PARAM F/U: HCPCS | Performed by: PHYSICIAN ASSISTANT

## 2019-06-06 PROCEDURE — 4040F PNEUMOC VAC/ADMIN/RCVD: CPT | Performed by: PHYSICIAN ASSISTANT

## 2019-06-06 PROCEDURE — 3017F COLORECTAL CA SCREEN DOC REV: CPT | Performed by: PHYSICIAN ASSISTANT

## 2019-06-06 PROCEDURE — 1036F TOBACCO NON-USER: CPT | Performed by: PHYSICIAN ASSISTANT

## 2019-06-06 PROCEDURE — G8427 DOCREV CUR MEDS BY ELIG CLIN: HCPCS | Performed by: PHYSICIAN ASSISTANT

## 2019-06-06 PROCEDURE — 97150 GROUP THERAPEUTIC PROCEDURES: CPT

## 2019-06-06 PROCEDURE — 1123F ACP DISCUSS/DSCN MKR DOCD: CPT | Performed by: PHYSICIAN ASSISTANT

## 2019-06-06 PROCEDURE — 99212 OFFICE O/P EST SF 10 MIN: CPT | Performed by: PHYSICIAN ASSISTANT

## 2019-06-06 NOTE — FLOWSHEET NOTE
Physical Therapy Aquatic Flow Sheet   Date:  2019    Patient Name:  Gisella Naranjo    :  1955    Restrictions/Precautions:    Pertinent Medical History: HIV, OA, B THR (, 10/18)    · Diagnosis:  history of B THR, hip pain    · Treatment Diagnosis:  Decreased strength and mobility    Insurance/Certification information: Mateo Saldana 48 Dual    Referring Physician: America Davidson MD    Plan of care signed (Y/N):      Visit# / total visits:    Pain level: 0/10 left and 0/10 right      Progress Note: []  Yes  [x]  No  Next due by: Visit #10:      History of Injury: Patient underwent B THR last year. PT ended at the end of the year. Patient used his 30 day free pass with the fitness center as able following PT. Patient notes that if he sneezes he gets intense 10/10 pain in his R groin. Patient states this was going on prior to PT and did not improve with THR. Patient also reports difficulty with lifting leg up into bed and the car. (2017)    Subjective:  My hips are getting stronger. My son noticed I was better too. Objective:   Observation:  See eval   Test measurements:      Key  B= Belt DB= Dumbells T= Theratube   G=Gloves N= Noodles W= Weights   P= Paddles WW=Water Walker K= Kickboard     *Pt amb to pool area with st cane. *   Transfers:   steps with bilat handrails, step over step.      % Immersion: 75%            Ambulation:   Exercises UE:      Forwards 6+2 Shoulder Shrugs     Lateral 2 Shoulder circles     Marching   1 Scapular Retraction      Retro   Rolling      Cariocas  Push Downs      IR/ER      Punching    Stretching: bilat Rowing    Gastroc/Soleus  20 sec x 3 Elbow Flex/Ext    Hamstring  20 sec x 3  Shldr Flex/Ext     Knee flex stretch   Shldr Abd/Add    Piriformis   Horiz Abd/Add     Hip flexor    Arm Circles     SKTC   PNF Diagonals    DKTC  UE oscillations f/b, s/s    ITB   Wall Push-ups    Quad  Figure 8's    Mid back   Buoy pull/push downs    UT  Tband rows    Rhom Tband lats    Post Shoulder  Lumbar Rot w/ paddles    Pec   Small ball pull downs                   diagonals             Cervical:       AROM Flex       AROM Extension     LEs exercises:  bilat AROM Side Bending    Marching  10 AROM Rotation    Heel Raises  10 Chin tucks    Toe Raises  10 Chin nods     Squats  10      Hamstring Curls  10      Hip Flexion  10 Balance: Hip Abduction 10 SLS 30 sec x 1   Hip Circles 10 x 2 Tandem stance 30 sec x 2   TA set  NBOS eyes open    Glut Set  NBOS eyes closed    Hip Extension  Hand to Opposite Knee    Hip Adduction    Box Step     Hip IR   Noodle Stance     Hip ER  Stop/Go Gait    Fig 8's  Switch Gait                Seated: bilat Functional:    Ankle Pumps 30 Step up forward 15 R/L   Ankle circles 10 Step up lateral    Knee flex & ext 30 Step down    Hip Abd & Adduction 30 [place] squats    Bicycle  30 Crate Lifts    Add Set with ball 10 Lunges forward    Abd Set with ball   PTA error Lunges lateral    Ankle INV  Lunges retro    Ankle EV  Lower ab curl with noodle      Upper ab curl with ball      Med ball straight lifts      Med ball diagonal lifts      Hydrorider      Foot onto/off of step    Noodle:      Leg Press 20 R/L Deep Water:    Noodle hang at wall  Jog    Noodle hang deep water  Jumping Jacks    Noodle Bicycle  Heel to toe      Hand to opposite knee      Cross country skier      Rocking Horse    Verbal cues for proper posture, exercise technique and exercise without increase pain. Individual Aquatic Timed Minutes:  20    Group Aquatic Timed Minutes:  35  Aquatic group therapy is the presence of more than 1 patient in the pool, at the same time. During that time each patient receives individualized instruction designed for their specific diagnosis.      Treatment/Activity Tolerance:  [x] Patient tolerated treatment well [] Patient limited by fatique  [] Patient limited by pain  [] Patient limited by other medical complications  [] Other:     Pain after

## 2019-06-06 NOTE — PROGRESS NOTES
Subjective:      Patient ID: Nia Woods is a 70 y.o.  male. Chief Complaint   Patient presents with    Follow-up     left knee        HPI: He is here for follow up on left knee(s). He states symptoms have improved with the recent cortisone injection performed on 4/22/19. Pain is on average 0-3/10. Pain is worse with increased activity/ weight bearing. Pain improves with rest/ elevation. Review of Systems:   Negative for fever or chills. Negative for significant numbness or tingling in lower extremity. Past Medical History:   Diagnosis Date    Arthritis     HIV (human immunodeficiency virus infection) ([facility] [address]        Family History   Problem Relation Age of Onset    Cancer Neg Hx        Past Surgical History:   Procedure Laterality Date    COLONOSCOPY      FINGER SURGERY Left     broken left pinky    JOINT REPLACEMENT Right 08/07/2018    THR    IA TOTAL HIP ARTHROPLASTY Left 10/31/2018    LEFT LATERAL TOTAL HIP REPLACEMENT performed by Janet Peres MD at [facility] Not on file   Tobacco Use    Smoking status: Former Smoker    Smokeless tobacco: Never Used    Tobacco comment: Quit 10/21/18   Substance and Sexual Activity    Alcohol use: Yes     Comment: rarely    Drug use: Yes     Types: Marijuana     Comment: last used 6/18    Sexual activity: Never       Current Outpatient Medications   Medication Sig Dispense Refill    gabapentin (NEURONTIN) 300 MG capsule Take 1 capsule by mouth 3 times daily for 10 days. . 30 capsule 0    aspirin EC 81 MG EC tablet Take 1 tablet by mouth 2 times daily Please avoid missing doses.  60 tablet 0    Ascorbic Acid (VITAMIN C) 100 MG CHEW Take 1 tablet by mouth daily      Calcium Carb-Cholecalciferol (CALCIUM 600+D3) 600-800 MG-UNIT TABS Take 1 tablet by mouth nightly      Multiple Vitamins-Minerals (THERAPEUTIC MULTIVITAMIN-MINERALS) tablet Take 1 tablet by mouth daily      Abacavir-Dolutegravir-Lamivud (TRIUMEQ PO) Take 1 tablet by mouth daily        No current facility-administered medications for this visit. Objective:   He   is alert, oriented x 3, pleasant, well nourished, developed and in no acute distress. /80   Pulse 63   Temp 97 °F (36.1 °C) (Temporal)   Resp 16   Ht 5' 6.5\" (1.689 m)   Wt 214 lb (97.1 kg)   BMI 34.02 kg/m²      KNEE EXAM:  Examination of the left knee shows: There is not erythema. There no soft tissue swelling. There is no effusion. There mild pain associated with ROM testing. Extensor Mechanism is  intact. X Rays: not performed in the office today:     Assessment:       ICD-10-CM    1. Primary osteoarthritis of left knee M17.12         Plan:     Degenerative arthritis left knee status post left knee cortisone injection. Doing better with cortisone injection. The natural history of the patient's diagnosis as well as the treatment options were discussed in full and questions were answered. Risks and benefits of the treatment options also reviewed in detail. Weight loss, activity modification, home exercise therapy program, NSAID'S, dietary changes have been discussed as a means to help control the symptoms. He  was advised that NSAID-type medications have two very important potential side effects: gastrointestinal irritation including hemorrhage and renal injuries. He was asked to take the medication with food and to stop if he experiences any GI upset. I asked him to call for vomiting, abdominal pain or black/bloody stools. He should have renal function testing per his medical provider periodically. The patient expresses understanding of these issues and questions were answered. Follow Up:  Call or return to clinic prn if these symptoms worsen or fail to improve as anticipated.

## 2019-06-11 ENCOUNTER — HOSPITAL ENCOUNTER (OUTPATIENT)
Dept: PHYSICAL THERAPY | Age: 64
Setting detail: THERAPIES SERIES
Discharge: HOME | End: 2019-06-11
Payer: MEDICARE

## 2019-06-11 PROCEDURE — 97150 GROUP THERAPEUTIC PROCEDURES: CPT

## 2019-06-11 PROCEDURE — 97113 AQUATIC THERAPY/EXERCISES: CPT

## 2019-06-11 NOTE — FLOWSHEET NOTE
Physical Therapy Aquatic Flow Sheet   Date:  2019    Patient Name:  Mariano Jackson    :  1955    Restrictions/Precautions:    Pertinent Medical History: HIV, OA, B THR (, 10/18)    · Diagnosis:  history of B THR, hip pain    · Treatment Diagnosis:  Decreased strength and mobility    Insurance/Certification information: Mateo Saldana 48 Dual    Referring Physician: Constantine Lopez MD    Plan of care signed (Y/N):      Visit# / total visits:    Pain level: 0/10 left and 0/10 right      Progress Note: []  Yes  [x]  No  Next due by: Visit #10:      History of Injury: Patient underwent B THR last year. PT ended at the end of the year. Patient used his 30 day free pass with the fitness center as able following PT. Patient notes that if he sneezes he gets intense 10/10 pain in his R groin. Patient states this was going on prior to PT and did not improve with THR. Patient also reports difficulty with lifting leg up into bed and the car. (2017)    Subjective:  I can walk faster now using the walker. Objective:   Observation:  See eval   Test measurements:      Key  B= Belt DB= Dumbells T= Theratube   G=Gloves N= Noodles W= Weights   P= Paddles WW=Water Walker K= Kickboard     *Pt amb to pool area with st cane. *   Transfers:   steps with bilat handrails, step over step.      % Immersion: 75%            Ambulation:   Exercises UE:      Forwards 6+2 Shoulder Shrugs     Lateral 4 Shoulder circles     Marching   2 Scapular Retraction      Retro   Rolling      Cariocas  Push Downs      IR/ER      Punching    Stretching: bilat Rowing    Gastroc/Soleus  20 sec x 3 Elbow Flex/Ext    Hamstring  20 sec x 3  Shldr Flex/Ext     Knee flex stretch   Shldr Abd/Add    Piriformis   Horiz Abd/Add     Hip flexor    Arm Circles     SKTC   PNF Diagonals    DKTC  UE oscillations f/b, s/s    ITB   Wall Push-ups    Quad  Figure 8's    Mid back   Buoy pull/push downs    UT  Tband rows    Rhom  Tband lats    Post Shoulder  Lumbar Rot w/ paddles    Pec   Small ball pull downs                   diagonals             Cervical:       AROM Flex       AROM Extension     LEs exercises:  bilat AROM Side Bending    Marching  10 AROM Rotation    Heel Raises  10 Chin tucks    Toe Raises  10 Chin nods     Squats  10      Hamstring Curls  10      Hip Flexion  10 Balance: Hip Abduction 10 SLS 30 sec x 1   Hip Circles 10 x 2 Tandem stance 30 sec x 2   TA set  NBOS eyes open    Glut Set  NBOS eyes closed    Hip Extension  Hand to Opposite Knee    Hip Adduction    Box Step     Hip IR   Noodle Stance     Hip ER  Stop/Go Gait    Fig 8's  Switch Gait                Seated: bilat Functional:    Ankle Pumps 30 Step up forward 20 R/L   Ankle circles 10 Step up lateral    Knee flex & ext 30 Step down    Hip Abd & Adduction 30 [place] squats    Bicycle  30 Crate Lifts    Add Set with ball 10 Lunges forward    Abd Set with ball  Right 10 with A Lunges lateral    Ankle INV  Lunges retro    Ankle EV  Lower ab curl with noodle      Upper ab curl with ball      Med ball straight lifts      Med ball diagonal lifts      Hydrorider      Foot onto/off of step    Noodle:      Leg Press 20 R/L Deep Water:    Noodle hang at wall  Jog    Noodle hang deep water  Jumping Jacks    Noodle Bicycle  Heel to toe      Hand to opposite knee      Cross country skier      Rocking Horse    Verbal cues for proper posture, exercise technique and exercise without increase pain. Individual Aquatic Timed Minutes:  20    Group Aquatic Timed Minutes:  45  Aquatic group therapy is the presence of more than 1 patient in the pool, at the same time. During that time each patient receives individualized instruction designed for their specific diagnosis.      Treatment/Activity Tolerance:  [x] Patient tolerated treatment well [] Patient limited by fatique  [] Patient limited by pain  [] Patient limited by other medical complications  [] Other:     Pain after aquatics: 0/10 right hip    Patient Education:    Plan:   [x] Continue per plan of care [] Alter current plan (see comments)  [] Plan of care initiated [] Hold pending MD visit [] Discharge    Plan for Next Session:  Gradually increase gait and exercise with an emphasis on posture, proper exercise tech, and no increase pain. Therapist will educate patient on lumbopelvic stabilization with exercise and ADL's. Add: box step, lateral step ups as tolerated.     Electronically signed by: Radha Armstrong, PTA  [id number]

## 2019-06-13 ENCOUNTER — HOSPITAL ENCOUNTER (OUTPATIENT)
Dept: PHYSICAL THERAPY | Age: 64
Setting detail: THERAPIES SERIES
Discharge: HOME | End: 2019-06-13
Payer: MEDICARE

## 2019-06-13 PROCEDURE — 97113 AQUATIC THERAPY/EXERCISES: CPT

## 2019-06-13 PROCEDURE — 97150 GROUP THERAPEUTIC PROCEDURES: CPT

## 2019-06-13 NOTE — FLOWSHEET NOTE
Physical Therapy Aquatic Flow Sheet   Date:  2019    Patient Name:  Kirby Ramos    :  1955    Restrictions/Precautions:    Pertinent Medical History: HIV, OA, B THR (, 10/18)    · Diagnosis:  history of B THR, hip pain    · Treatment Diagnosis:  Decreased strength and mobility    Insurance/Certification information: Mateo Saldana 48 Dual    Referring Physician: Alina Harris MD    Plan of care signed (Y/N):      Visit# / total visits:    Pain level: 0/10 left and 0/10 right      Progress Note: []  Yes  [x]  No  Next due by: Visit #10:      History of Injury: Patient underwent B THR last year. PT ended at the end of the year. Patient used his 30 day free pass with the fitness center as able following PT. Patient notes that if he sneezes he gets intense 10/10 pain in his R groin. Patient states this was going on prior to PT and did not improve with THR. Patient also reports difficulty with lifting leg up into bed and the car. (2017)    Subjective: No pain. 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 Objective:   Observation:  See eval   Test measurements:      Key  B= Belt DB= Dumbells T= Theratube   G=Gloves N= Noodles W= Weights   P= Paddles WW=Water Walker K= Kickboard     *Pt amb to pool area with st cane. *   Transfers:   steps with bilat handrails, step over step.      % Immersion: 75%            Ambulation:   Exercises UE:      Forwards 6+2 Shoulder Shrugs     Lateral 4 Shoulder circles     Marching   2 Scapular Retraction      Retro  1 Rolling      Cariocas  Push Downs      IR/ER      Punching    Stretching: bilat Rowing    Gastroc/Soleus  20 sec x 3 Elbow Flex/Ext    Hamstring  20 sec x 3  Shldr Flex/Ext     Knee flex stretch   Shldr Abd/Add    Piriformis   Horiz Abd/Add     Hip flexor    Arm Circles     SKTC   PNF Diagonals    DKTC  UE oscillations f/b, s/s    ITB   Wall Push-ups    Quad  Figure 8's    Mid back   Buoy pull/push downs    UT  Tband rows    Rhom  Tband lats    Post Shoulder  Lumbar Rot w/ paddles    Pec   Small ball pull downs                   diagonals             Cervical:       AROM Flex       AROM Extension     LEs exercises:  bilat AROM Side Bending    Marching  10 AROM Rotation    Heel Raises  10 Chin tucks    Toe Raises  10 Chin nods     Squats  10      Hamstring Curls  10      Hip Flexion  10 Balance: Hip Abduction 10 SLS 30 sec x 1   Hip Circles 10 x 2 Tandem stance 30 sec x 2   TA set  NBOS eyes open    Glut Set  NBOS eyes closed    Hip Extension  Hand to Opposite Knee    Hip Adduction    Box Step  3 R/L   Hip IR   Noodle Stance     Hip ER  Stop/Go Gait    Fig 8's  Switch Gait                Seated: bilat Functional:    Ankle Pumps 30 Step up forward 20 R/L   Ankle circles 10 Step up lateral 10 R/L   Knee flex & ext 30 Step down    Hip Abd & Adduction 30 Delavan squats    Bicycle  30 Crate Lifts    Add Set with ball 10 Lunges forward    Abd Set with ball  Right 10 with A Lunges lateral    Ankle INV  Lunges retro    Ankle EV  Lower ab curl with noodle      Upper ab curl with ball      Med ball straight lifts      Med ball diagonal lifts      Hydrorider      Foot onto/off of step    Noodle:      Leg Press 20 R/L Deep Water:    Noodle hang at wall  Jog    Noodle hang deep water  Jumping Jacks    Noodle Bicycle  Heel to toe      Hand to opposite knee      Cross country skier      Rocking Horse    Verbal cues for proper posture, exercise technique and exercise without increase pain. Individual Aquatic Timed Minutes:  20    Group Aquatic Timed Minutes:  50  Aquatic group therapy is the presence of more than 1 patient in the pool, at the same time. During that time each patient receives individualized instruction designed for their specific diagnosis.      Treatment/Activity Tolerance:  [x] Patient tolerated treatment well [] Patient limited by fatique  [] Patient limited by pain  [] Patient limited by other medical complications  [] Other:     Pain after aquatics: 0/10 right hip    Patient Education:    Plan:   [x] Continue per plan of care [] Alter current plan (see comments)  [] Plan of care initiated [] Hold pending MD visit [] Discharge    Plan for Next Session:      Add: Re-eval next appt.     Electronically signed by: Clint Rothman, PTA  [id number]

## 2019-06-18 ENCOUNTER — HOSPITAL ENCOUNTER (OUTPATIENT)
Dept: PHYSICAL THERAPY | Age: 64
Setting detail: THERAPIES SERIES
Discharge: HOME | End: 2019-06-18
Payer: MEDICARE

## 2019-06-18 PROCEDURE — 97530 THERAPEUTIC ACTIVITIES: CPT | Performed by: CHIROPRACTOR

## 2019-06-18 PROCEDURE — 97113 AQUATIC THERAPY/EXERCISES: CPT

## 2019-06-18 PROCEDURE — 97150 GROUP THERAPEUTIC PROCEDURES: CPT

## 2019-06-18 NOTE — FLOWSHEET NOTE
Physical Therapy Aquatic Flow Sheet   Date:  2019    Patient Name:  Anupama Nolasco    :  1955    Restrictions/Precautions:    Pertinent Medical History: HIV, OA, B THR (, 10/18)    · Diagnosis:  history of B THR, hip pain    · Treatment Diagnosis:  Decreased strength and mobility    Insurance/Certification information: Mateo Saldana 48 Dual    Referring Physician: Ellen Glasgow MD    Plan of care signed (Y/N):      Visit# / total visits:    Pain level: 0/10 left and 1/10 right      Progress Note: []  Yes  [x]  No  Next due by: Visit #10:      History of Injury: Patient underwent B THR last year. PT ended at the end of the year. Patient used his 30 day free pass with the fitness center as able following PT. Patient notes that if he sneezes he gets intense 10/10 pain in his R groin. Patient states this was going on prior to PT and did not improve with THR. Patient also reports difficulty with lifting leg up into bed and the car. (2017)    Subjective: I may be overdoing it at home. Objective:   Observation:  See eval   Test measurements:      Key  B= Belt DB= Dumbells T= Theratube   G=Gloves N= Noodles W= Weights   P= Paddles WW=Water Walker K= Kickboard     *Pt amb to pool area with st cane. *   Transfers:   steps with bilat handrails, step over step.      % Immersion: 75%            Ambulation:   Exercises UE:      Forwards 6+2 Shoulder Shrugs     Lateral 4 Shoulder circles     Marching    Scapular Retraction      Retro   Rolling      Cariocas  Push Downs      IR/ER      Punching    Stretching: bilat Rowing    Gastroc/Soleus  20 sec x 3 Elbow Flex/Ext    Hamstring  20 sec x 3  Shldr Flex/Ext     Knee flex stretch   Shldr Abd/Add    Piriformis   Horiz Abd/Add     Hip flexor    Arm Circles     SKTC   PNF Diagonals    DKTC  UE oscillations f/b, s/s    ITB   Wall Push-ups    Quad  Figure 8's    Mid back   Buoy pull/push downs    UT  Tband rows    Rhom  Tband lats    Post Shoulder  Lumbar Rot w/ paddles    Pec   Small ball pull downs                   diagonals             Cervical:       AROM Flex       AROM Extension     LEs exercises:  bilat AROM Side Bending    Marching  10 AROM Rotation    Heel Raises  10 Chin tucks    Toe Raises  10 Chin nods     Squats  10      Hamstring Curls  10      Hip Flexion  10 Balance: Hip Abduction 10 SLS 30 sec x 1   Hip Circles 10 x 2 Tandem stance 30 sec x 2   TA set  NBOS eyes open    Glut Set  NBOS eyes closed    Hip Extension  Hand to Opposite Knee    Hip Adduction    Box Step    Hip IR   Noodle Stance     Hip ER  Stop/Go Gait    Fig 8's  Switch Gait                Seated: bilat Functional:    Ankle Pumps 30 Step up forward 20 R/L   Ankle circles 10 Step up lateral    Knee flex & ext 30 Step down    Hip Abd & Adduction 30 Elbe squats    Bicycle  30 Crate Lifts    Add Set with ball 10 Lunges forward    Abd Set with ball   Lunges lateral    Ankle INV  Lunges retro    Ankle EV  Lower ab curl with noodle      Upper ab curl with ball      Med ball straight lifts      Med ball diagonal lifts      Hydrorider      Foot onto/off of step    Noodle:      Leg Press 20 R/L Deep Water:    Noodle hang at wall  Jog    Noodle hang deep water  Jumping Jacks    Noodle Bicycle  Heel to toe      Hand to opposite knee      Cross country skier      Rocking Horse    Verbal cues for proper posture, exercise technique and exercise without increase pain. **Unable to complete all exercises due to time, needing to leave to get ride home. **    Individual Aquatic Timed Minutes:  20    Group Aquatic Timed Minutes:  40  Aquatic group therapy is the presence of more than 1 patient in the pool, at the same time. During that time each patient receives individualized instruction designed for their specific diagnosis.      Treatment/Activity Tolerance:  [x] Patient tolerated treatment well [] Patient limited by fatique  [] Patient limited by pain  [] Patient limited by other medical complications  [] Other:     Pain after aquatics: 0/10 right hip    Patient Education:  Pt was given written aquatic exercises, 30 day pass, pool schedule and list of pools in the city by PT at [facility].  Pt was encouraged to call PTA with any questions. Pt verbalized understanding.   Plan:   [x] Continue per plan of care [] Alter current plan (see comments)  [] Plan of care initiated [] Hold pending MD visit [x] Discharge    Plan for Next Session:      Electronically signed by: Joseph Rich, PTA  [id number]

## 2019-06-18 NOTE — FLOWSHEET NOTE
Physical Therapy Daily Treatment Note  Date:  2019    Patient Name:  Brett Daniels    :  1955  MRN: [id number]    Restrictions/Precautions:  Fall Risk(low: pt denies any history of falls)    Pertinent Medical History: HIV, OA, B THR (, 10/18)    Medical/Treatment Diagnosis Information:  · Diagnosis: history of B THR, hip pain  · Treatment Diagnosis: Decreased strength and mobility    Insurance/Certification information:  PT Insurance Information: Mateo Theronhéctor 48 Dual  Physician Information:  Referring Practitioner: Linwood Bowers MD  Plan of care signed (Y/N): sent for cosign       Visit# / total visits:    Pain level: 3/10     Functional Outcomes Measure: at eval  Test: LEFS  Score: 48    Progress Note: []  Yes  [x]  No  Next due by: Visit #10      History of Injury:  Patient underwent B THR last year. PT ended at the end of the year. Patient used his 30 day free pass with the fitness center as able following PT. Patient notes that if he sneezes he gets intense 10/10 pain in his R groin. Patient states this was going on prior to PT and did not improve with THR. Patient also reports difficulty with lifting leg up into bed and the car. (2017)    Subjective:   Main complaint is pain with active hip flex or abd    Objective:  Observation:   Palpation: mod TTP R greater trochanter  Observation: Patient ambulates with a cane , but states that he sometimes uses his walker at home  Edema: WNL  Scar: incisions healed with no signs of infection  Test measurements:    ROM: R hip  Date              Hip Flexion Hip Extension Hip Abduction Hip IR Hip ER   Eval   90 5 WNL WNL Min decreased             Strength:  Date Hip flexion Hip abduction Hip Extension Hip IR Hip ER Quad Hamstring Ankle DF Ankle PF   Eval  3/5  3-/5 3/5 4/5 4/5 4+/5 5/5 5/5 5/5    19 3+/5 3/5 5/5 4+/5 4+/5 4+/5 5/5       Exercises:  Exercise/Equipment Resistance/Repetitions Other comments        SL darin Reviewed for HEP    Supine hip abd Reviewed for HEP    hooklying tband hip abd Reviewed for HEP    SL abd Reviewed for HEP                Other Therapeutic Activities:  Pt was educated on PT POC, Diagnosis, Prognosis, pathomechanics as well as frequency and duration of scheduling future physical therapy appointments. Time was also taken on this day to answer all patient questions and participation in PT. Reviewed appointment policy in detail with patient and patient verbalized understanding. Home Exercise Program:  Patient instructed in the following for HEP: SL clamshell, supine hip abd, hooklying tband hip abd, and SL abd. Also reviewed previously given HEP at last therapy's discharge visit. Patient instructed that the above 4 exercises need to be performed 1-2x/day daily. Other exercises can be done as time allows. Patient verbalized/demonstrated understanding and was issued written handout. Timed Code Treatment Minutes:   25    Total Treatment Minutes:  25    Assessment:  [] Patient tolerated treatment well [] Patient limited by fatigue  [x] Patient limited by pain  [] Patient limited by other medical complications  [] Other:     Prognosis: [x] Good [] Fair  [] Poor    Goals:    Short term goals  Time Frame for Short term goals: 3 weeks  Short term goal 1: Patient will be independent with HEP for prevention of reinjury. Short term goal 2: Patient will report 30% improvement with pain for ease with ADLs. Short term goal 3: Patient will tolerate 45 mins of aquatic exercises without increased pain. Long term goals  Time Frame for Long term goals : at discharge  Long term goal 1: Increase R hip strength to 4/5 for ease with ambulation. Long term goal 2: Patient will report 70% improvement with pain for ease with ADLs. Long term goal 3: Increase R hip flexion PROM to 100 for ease with don/doff shoes.       Patient Requires Follow-up: [x] Yes  [] No    Plan:   [] Continue per plan of care [] Alter current plan (see comments)  [x] Plan of care initiated [] Hold pending MD visit [] Discharge    Plan for Next Session:  DC to HEP    Electronically signed by: Hilma Runner EO#[id number]

## 2019-08-17 ENCOUNTER — HOSPITAL ENCOUNTER (EMERGENCY)
Age: 64
Discharge: HOME | End: 2019-08-17
Payer: MEDICARE

## 2019-08-17 VITALS
SYSTOLIC BLOOD PRESSURE: 113 MMHG | BODY MASS INDEX: 33.32 KG/M2 | HEIGHT: 67 IN | DIASTOLIC BLOOD PRESSURE: 73 MMHG | TEMPERATURE: 97.8 F | RESPIRATION RATE: 16 BRPM | OXYGEN SATURATION: 98 % | WEIGHT: 212.3 LBS | HEART RATE: 78 BPM

## 2019-08-17 DIAGNOSIS — S39.012A STRAIN OF LUMBAR REGION, INITIAL ENCOUNTER: Primary | ICD-10-CM

## 2019-08-17 PROCEDURE — 6370000000 HC RX 637 (ALT 250 FOR IP): Performed by: PHYSICIAN ASSISTANT

## 2019-08-17 PROCEDURE — 99283 EMERGENCY DEPT VISIT LOW MDM: CPT

## 2019-08-17 RX ORDER — HYDROCODONE BITARTRATE AND ACETAMINOPHEN 5; 325 MG/1; MG/1
1 TABLET ORAL EVERY 8 HOURS PRN
Qty: 10 TABLET | Refills: 0 | Status: SHIPPED | OUTPATIENT
Start: 2019-08-17 | End: 2019-08-20

## 2019-08-17 RX ORDER — HYDROCODONE BITARTRATE AND ACETAMINOPHEN 5; 325 MG/1; MG/1
1 TABLET ORAL ONCE
Status: COMPLETED | OUTPATIENT
Start: 2019-08-17 | End: 2019-08-17

## 2019-08-17 RX ADMIN — HYDROCODONE BITARTRATE AND ACETAMINOPHEN 1 TABLET: 5; 325 TABLET ORAL at 17:35

## 2019-08-17 ASSESSMENT — PAIN DESCRIPTION - DESCRIPTORS
DESCRIPTORS: ACHING;DISCOMFORT
DESCRIPTORS: ACHING;DISCOMFORT

## 2019-08-17 ASSESSMENT — ENCOUNTER SYMPTOMS
ABDOMINAL PAIN: 0
BACK PAIN: 1
VOMITING: 0
NAUSEA: 0
COLOR CHANGE: 0
CHEST TIGHTNESS: 0
SHORTNESS OF BREATH: 0

## 2019-08-17 ASSESSMENT — PAIN DESCRIPTION - ONSET
ONSET: ON-GOING
ONSET: GRADUAL

## 2019-08-17 ASSESSMENT — PAIN DESCRIPTION - PAIN TYPE
TYPE: ACUTE PAIN
TYPE: ACUTE PAIN

## 2019-08-17 ASSESSMENT — PAIN SCALES - GENERAL
PAINLEVEL_OUTOF10: 7
PAINLEVEL_OUTOF10: 7

## 2019-08-17 ASSESSMENT — PAIN DESCRIPTION - ORIENTATION
ORIENTATION: LOWER
ORIENTATION: LOWER

## 2019-08-17 ASSESSMENT — PAIN DESCRIPTION - FREQUENCY
FREQUENCY: CONTINUOUS
FREQUENCY: INTERMITTENT

## 2019-08-17 ASSESSMENT — PAIN - FUNCTIONAL ASSESSMENT
PAIN_FUNCTIONAL_ASSESSMENT: PREVENTS OR INTERFERES SOME ACTIVE ACTIVITIES AND ADLS
PAIN_FUNCTIONAL_ASSESSMENT: ACTIVITIES ARE NOT PREVENTED
PAIN_FUNCTIONAL_ASSESSMENT: 0-10

## 2019-08-17 ASSESSMENT — PAIN DESCRIPTION - LOCATION
LOCATION: BACK
LOCATION: BACK

## 2019-08-17 ASSESSMENT — PAIN DESCRIPTION - PROGRESSION: CLINICAL_PROGRESSION: GRADUALLY IMPROVING

## 2019-08-17 NOTE — ED PROVIDER NOTES
[address]  [address] [address]  Dept: [phone]  Loc: [phone]  eMERGENCYdEPARTMENT eNCOUnter      Pt Name: Johanne Pandey  MRN: [id number]  Zacharygfsarah 1955  Date of evaluation: 8/17/2019  Provider:Trisha Sommer PA-C    CHIEF COMPLAINT       Chief Complaint   Patient presents with    Back Pain     Pt comes in complaining of lower back pain of 7/10 and gets worse when he ambulates or stands. Pt states he was wiping his car yesterday around 2pm when it started. He took two 500mg tylenol with no relief. HISTORY OF PRESENT ILLNESS  (Location/Symptom, Timing/Onset, Context/Setting, Quality, Duration,Modifying Factors, Severity.)   Johanne Pandey is a 75 y.o. male who presents to the emergency department by private vehicle complaining of low back pain. Patient states he has pain throughout his low back after washing his car yesterday. Pain began after washing car, denies any distinct trauma to back. Has been \"stiff\" all night since. Took Tylenol with no significant improvement. Denies any leg weakness/numbness/tingling, urinary/bowel incontinence, urinary retention, saddle anesthesia. Pain rated 7/10. All pain worsens with movement. Has tried massage at home as well with no significant improvement. Denies abdominal pain, chest pain, nausea, vomiting. Feels well otherwise. Nursing Notes were reviewedand agreed with or any disagreements were addressed in the HPI. REVIEW OF SYSTEMS    (2-9 systems for level 4, 10 or more for level 5)     Review of Systems   Constitutional: Negative for chills and fever. HENT: Negative. Respiratory: Negative for chest tightness and shortness of breath. Cardiovascular: Negative. Gastrointestinal: Negative for abdominal pain, nausea and vomiting. Genitourinary: Negative. Musculoskeletal: Positive for back pain. Negative for myalgias.    Skin: Negative for L4/L5/S1 strength +5/5, patella reflexes +2, sensation intact and equal bilaterally, dorsalis pedis pulses +2, ambulating in ED without assistance or difficulty     Neurological: He is alert and oriented to person, place, and time. Skin: Skin is warm. He is not diaphoretic. No erythema. Psychiatric: He has a normal mood and affect. His behavior is normal.   Vitals reviewed. DIAGNOSTIC RESULTS     EKG: All EKG's are interpreted by the Emergency Department Physician who either signs or Co-signs this chart in the absence of a cardiologist.    RADIOLOGY:   Non-plain film images such as CT, Ultrasound and MRI are read by the radiologist. Plain radiographic images are visualized and preliminarilyinterpreted by the emergency physician with the below findings:    Interpretation per the Radiologist below,if available at the time of this note:    No orders to display         LABS:  Labs Reviewed - No data to display    All other labs were within normal range or not returned as of this dictation. EMERGENCY DEPARTMENT COURSE and DIFFERENTIAL DIAGNOSIS/MDM:   Vitals:    Vitals:    08/17/19 1638   BP: 113/73   Pulse: 78   Resp: 16   Temp: 97.8 °F (36.6 °C)   TempSrc: Oral   SpO2: 98%   Weight: 212 lb 4.9 oz (96.3 kg)   Height: 5' 7\" (1.702 m)       MDM     Patient presents ED with HPI noted above. He is hemodynamically stable, afebrile and nontoxic-appearing. He is not hypoxic with oxygen saturation of 98% on room air. Physical exam as above. Patient with no focal midline tenderness. No traumatic injury to back. No red flags regarding back pain. X-ray or further advanced imaging not indicated this time. Pain began after washing car yesterday. Do suspect muscular skeletal etiology. Patient has tried Tylenol at home, he cannot take NSAIDs. After further discussion with patient he is tried muscle relaxers previously with no significant improvement.   Will give patient a couple days and Norco, patient has a

## 2019-08-27 ENCOUNTER — OFFICE VISIT (OUTPATIENT)
Dept: ORTHOPEDIC SURGERY | Age: 64
End: 2019-08-27
Payer: MEDICARE

## 2019-08-27 VITALS
TEMPERATURE: 98.3 F | SYSTOLIC BLOOD PRESSURE: 130 MMHG | DIASTOLIC BLOOD PRESSURE: 81 MMHG | HEIGHT: 67 IN | BODY MASS INDEX: 33.27 KG/M2 | HEART RATE: 73 BPM | WEIGHT: 212 LBS

## 2019-08-27 DIAGNOSIS — M51.36 DDD (DEGENERATIVE DISC DISEASE), LUMBAR: ICD-10-CM

## 2019-08-27 DIAGNOSIS — S39.012A STRAIN OF LUMBAR REGION, INITIAL ENCOUNTER: ICD-10-CM

## 2019-08-27 DIAGNOSIS — M43.17 ACQUIRED SPONDYLOLISTHESIS OF LUMBOSACRAL REGION: ICD-10-CM

## 2019-08-27 DIAGNOSIS — M54.50 MIDLINE LOW BACK PAIN WITHOUT SCIATICA, UNSPECIFIED CHRONICITY: Primary | ICD-10-CM

## 2019-08-27 PROCEDURE — 1036F TOBACCO NON-USER: CPT | Performed by: PHYSICIAN ASSISTANT

## 2019-08-27 PROCEDURE — 99214 OFFICE O/P EST MOD 30 MIN: CPT | Performed by: PHYSICIAN ASSISTANT

## 2019-08-27 PROCEDURE — 4040F PNEUMOC VAC/ADMIN/RCVD: CPT | Performed by: PHYSICIAN ASSISTANT

## 2019-08-27 PROCEDURE — 1123F ACP DISCUSS/DSCN MKR DOCD: CPT | Performed by: PHYSICIAN ASSISTANT

## 2019-08-27 PROCEDURE — G8427 DOCREV CUR MEDS BY ELIG CLIN: HCPCS | Performed by: PHYSICIAN ASSISTANT

## 2019-08-27 PROCEDURE — G8417 CALC BMI ABV UP PARAM F/U: HCPCS | Performed by: PHYSICIAN ASSISTANT

## 2019-08-27 PROCEDURE — 3017F COLORECTAL CA SCREEN DOC REV: CPT | Performed by: PHYSICIAN ASSISTANT

## 2019-08-27 RX ORDER — BACLOFEN 10 MG/1
10 TABLET ORAL DAILY
Qty: 45 TABLET | Refills: 0 | Status: SHIPPED | OUTPATIENT
Start: 2019-08-27

## 2019-08-28 PROBLEM — M43.17 ACQUIRED SPONDYLOLISTHESIS OF LUMBOSACRAL REGION: Status: ACTIVE | Noted: 2019-08-28

## 2019-08-28 PROBLEM — M51.36 DDD (DEGENERATIVE DISC DISEASE), LUMBAR: Status: ACTIVE | Noted: 2019-08-28

## 2019-08-28 NOTE — PROGRESS NOTES
Subjective:      Patient ID: Nabeel Balbuena is a 75 y.o.  male. Chief Complaint   Patient presents with    Back Pain        HPI: He is here for an initial evaluation of LBP    His present pain began 8/23/2019 after bending over 2 times to  a bucket. Felt pain in his low back. Pain has not changed since onset. Pain is associated with:  Numbness: none. Tingling: none. Perceived weakness: no.   Difficulty controlling bowels: No.  Difficulty controlling bladder: No.  Electrical shock sensation in his legs: No.  Difficulty with balance while standing or walking: No.    The following changes pain for the better: Sitting, rising from seated position walking and lying down. The following changes pain for the worse: Standing, leaning forward, leaning on a grocery cart. The following treatment has been tried:  Physical therapy: no.  Chiropractic treatment: no.  Epidural steroid injection/block: no.   Prescription medications: Yes-ER physician prescribed Norco for pain. Over-the-counter medications: None. Review of Systems:  Pertinent items are noted in HPI. A 14 point review of systems have been reviewed from Patient History Form as well as the Spine Form dated on 8/27/2019 and available in the patient's chart under the media tab.     Past Medical History:   Diagnosis Date    Arthritis     HIV (human immunodeficiency virus infection) ([facility] [address]        Family History   Problem Relation Age of Onset    Cancer Neg Hx        Past Surgical History:   Procedure Laterality Date    COLONOSCOPY      FINGER SURGERY Left     broken left pinky    JOINT REPLACEMENT Right 08/07/2018    THR    AL TOTAL HIP ARTHROPLASTY Left 10/31/2018    LEFT LATERAL TOTAL HIP REPLACEMENT performed by Sheeba Earl MD at [facility] Not on file   Tobacco Use    Smoking status: Former Smoker    Smokeless tobacco: Never Used    Tobacco comment: Quit 10/21/18   Substance and disease), lumbar M51.36 Ambulatory referral to Physical Therapy   4. Acquired spondylolisthesis of lumbosacral region L3-4 Grade I M43.17 Ambulatory referral to Physical Therapy        Assessment/ Plan:      He has advanced degenerative disc disease and a spondylolisthesis L L3. He remains neurologically intact. I think most of his pain is related to a lumbar sprain or strain. Symptoms began after bending over to  a half full bucket of water. The natural history of the patient's diagnosis as well as the treatment options were discussed in full and questions were answered. Risks and benefits of the treatment options also reviewed in detail. Proper lifting/bending technique discussed  Stretching exercises discussed with patient  Muscle relaxant prescribed- Baclofen, patient warned about possible sedation  Physical therapy referral provided        Follow Up: 6 weeks. Call or return to clinic prn if these symptoms worsen or fail to improve as anticipated.

## 2019-09-09 ENCOUNTER — HOSPITAL ENCOUNTER (OUTPATIENT)
Dept: PHYSICAL THERAPY | Age: 64
Setting detail: THERAPIES SERIES
Discharge: HOME | End: 2019-09-09
Payer: MEDICARE

## 2019-09-09 PROCEDURE — 97161 PT EVAL LOW COMPLEX 20 MIN: CPT

## 2019-09-09 PROCEDURE — 97530 THERAPEUTIC ACTIVITIES: CPT

## 2019-09-09 ASSESSMENT — PAIN SCALES - QUEBEC BACK PAIN DISABILITY SCALE
SLEEP THROUGH THE NIGHT: 0
RIDE IN A CAR: 0
QUEBEC DISABILITY INDEX: 1-19%
REACH UP TO HIGH SHELVES: 0
CLIMB ONE FLIGHT OF STAIRS: 1
SIT IN A CHAIR FOR SEVERAL HOURS: 0
QUEBEC CMS MODIFIER: CI
CARRY TWO BAGS OF GROCERIES: 0
STAND UP FOR 20 TO 30 MINUTES: 0
LIFT AND CARRY A HEAVY SUITCASE: 0
TOTAL SCORE: 16
TURN OVER IN BED: 2
PUT ON SOCKS OR PANYHOSE: 0
TAKE FOOD OUT OF THE REFRIGERATOR: 0
MOVE A CHAIR: 0
THROW A BALL: 0
WALK SEVERAL KILOMETERS  OR MILES: 3
GET OUT OF BED: 0
PULL OR PUSH HEAVY DOORS: 1
BEND OVER TO CLEAN THE BATHTUB: 2
RUN ONE BLOCK OR 100M: 5
MAKE YOUR BED: 0
WALK A FEW BLOCKS OR 300 TO 400M: 2

## 2019-09-09 NOTE — FLOWSHEET NOTE
Physical Therapy Daily Treatment Note and Discharge  Date:  2019    Patient Name:  Mazin Del Valle    :  1955  MRN: [id number]    Restrictions/Precautions: Position Activity Restriction  Other position/activity restrictions: low fall risk    Pertinent Medical History: Additional Pertinent Hx: HIV, OA, B THR (, 10/18)    Medical/Treatment Diagnosis Information:  ·  Dx - lumbar strain  · Treatment Diagnosis: Pt post lumbar strain, no deficits noted    Insurance/Certification information:  PT Insurance Information: ElinaLoretta Campbell Dual  Physician Information:  Referring Practitioner: Adalgisa Gomez MD  Plan of care signed (Y/N):  Sent     Visit# / total visits:    Pain level: 0/10     Functional Assessment:   Test:  [place]  Score: eval/dc 16    History of Injury:    Pt notes sudden onset LBP 7/10 8/16 while wiping down his car. Per MD visit, advanced DDD and L L3 spondylolisthesis, Dx with strain/sprain of lumbar region. Subjective:  Pt notes no pain since started the Baclofen.   NO pain at rest, or with ADL.)    Objective:  See eval      Exercise/Equipment Resistance/Repetitions Other comments        Review of pmh, hpi, medication Pt painfree since Baclofen started         Review of home set up, self care and adl indep         Review of proper body mechanics Reviewed reaching, lifting Reviewed proper lifting,            Timed Code Treatment Minutes:  30    Total Treatment Minutes:  45    Treatment/Activity Tolerance:  [x] Patient tolerated treatment well [] Patient limited by fatigue  [] Patient limited by pain  [] Patient limited by other medical complications  [] Other:     Prognosis: [x] Good [] Fair  [] Poor      Patient Requires Follow-up: [] Yes  [x] No    Plan:   [] Continue per plan of care [] Alter current plan (see comments)  [] Plan of care initiated [] Hold pending MD visit [x] Discharge        Electronically signed by:  Yvon Swanson

## 2019-09-09 NOTE — PLAN OF CARE
Outpatient Physical Therapy  [x] [facility]    Phone: [phone]   Fax: [phone]   [] [facility]  Phone: [phone]              Fax: [phone]  [] [facility]   Phone: [phone]   Fax: [phone]     To: Referring Practitioner: Madai Cummings MD      Patient: Destinee Lopez   : 1955   MRN: [id number]  Evaluation Date: 2019      Diagnosis Information:  ·  Dx - lumbar strain     · Treatment Dx - pt painfree,no deficits noted    Physical Therapy Certification  Dear Adelaida Cardenas,   The following patient has been evaluated for physical therapy services and for therapy to continue, Medicare requires monthly physician review of the treatment plan. Please review the attached evaluation and/or summary of the patient's plan of care, and verify that you agree therapy should continue by signing the attached document and sending it back to our office. Plan -  Pt is currently painfree without any deficits in range, strength or functional mobility. No therapy needs noted at this time. Electronically signed by:  Cirilo Tate, PT  [id number]      If you have any questions or concerns, please don't hesitate to call.   Thank you for your referral.      Physician Signature:________________________________Date:__________________  By signing above, therapists plan is approved by physician

## 2019-09-09 NOTE — PROGRESS NOTES
Physical Therapy  Initial Assessment  Date: 2019  Patient Name: Chely Hernadez  MRN: [id number]  : 1955     Treatment Diagnosis: Pt post lumbar strain, no deficits noted    Restrictions  Position Activity Restriction  Other position/activity restrictions: low fall risk    Subjective   General  Additional Pertinent Hx: HIV, OA, B THR (, 10/18)  Referring Practitioner: Lars Montelongo MD  Referral Date : 19  PT Visit Information  Onset Date: 19  PT Insurance Information: Mateo Saldana  Dual  Subjective  Subjective: Pt notes sudden onset LBP 7/10 8/16 while wiping down his car. Per MD visit, advanced DDD and L L3 spondylolisthesis, Dx with strain/sprain of lumbar region. Pain Screening   (Pt notes no pain since started the Baclofen. NO pain at rest, or with ADL.)      Vision/Hearing  Vision  Vision: Within Functional Limits  Hearing  Hearing: Within functional limits    Orientation  Orientation  Overall Orientation Status: Within Normal Limits    Social/Functional History  Social/Functional History  Lives With: Significant other  Type of Home: House  Home Access: Stairs to enter without rails  Entrance Stairs - Number of Steps: 2-3 BARAK  Bathroom Shower/Tub: Tub/Shower unit  Bathroom Toilet: Handicap height  Bathroom Equipment: Shower chair  Bathroom Accessibility: Accessible  Home Equipment: Cane;Lift chair;Rolling walker;Reacher;Long-handled shoehorn  ADL Assistance: Independent  Homemaking Assistance: (shares with significant other)  Ambulation Assistance: Independent  Transfer Assistance: Independent  Active : Yes  Occupation: On disability    Objective     Observation/Palpation  Palpation: No TTP lumbar paraspinals or gluteals  Observation: Pt ambulates with SPC with good stability    AROM RLE (degrees)  RLE AROM: WFL  AROM LLE (degrees)  LLE AROM : WFL  Spine  Lumbar: side bending and rotation 75%, flexion  WFL, extension 75% , no pain with any motion.     Strength

## 2019-09-11 ENCOUNTER — APPOINTMENT (OUTPATIENT)
Dept: PHYSICAL THERAPY | Age: 64
End: 2019-09-11
Payer: MEDICARE

## 2019-09-13 ENCOUNTER — APPOINTMENT (OUTPATIENT)
Dept: PHYSICAL THERAPY | Age: 64
End: 2019-09-13
Payer: MEDICARE

## 2019-09-16 ENCOUNTER — APPOINTMENT (OUTPATIENT)
Dept: PHYSICAL THERAPY | Age: 64
End: 2019-09-16
Payer: MEDICARE

## 2019-09-18 ENCOUNTER — APPOINTMENT (OUTPATIENT)
Dept: PHYSICAL THERAPY | Age: 64
End: 2019-09-18
Payer: MEDICARE

## 2019-09-20 ENCOUNTER — APPOINTMENT (OUTPATIENT)
Dept: PHYSICAL THERAPY | Age: 64
End: 2019-09-20
Payer: MEDICARE

## 2019-09-23 ENCOUNTER — APPOINTMENT (OUTPATIENT)
Dept: PHYSICAL THERAPY | Age: 64
End: 2019-09-23
Payer: MEDICARE

## 2019-09-25 ENCOUNTER — APPOINTMENT (OUTPATIENT)
Dept: PHYSICAL THERAPY | Age: 64
End: 2019-09-25
Payer: MEDICARE

## 2019-09-27 ENCOUNTER — APPOINTMENT (OUTPATIENT)
Dept: PHYSICAL THERAPY | Age: 64
End: 2019-09-27
Payer: MEDICARE

## 2019-09-30 ENCOUNTER — APPOINTMENT (OUTPATIENT)
Dept: PHYSICAL THERAPY | Age: 64
End: 2019-09-30
Payer: MEDICARE

## 2020-03-25 PROBLEM — M16.12 ARTHRITIS OF LEFT HIP: Status: RESOLVED | Noted: 2018-10-04 | Resolved: 2020-03-24

## 2020-06-15 ENCOUNTER — OFFICE VISIT (OUTPATIENT)
Dept: ORTHOPEDIC SURGERY | Age: 65
End: 2020-06-15
Payer: MEDICARE

## 2020-06-15 VITALS — TEMPERATURE: 98.2 F

## 2020-06-15 PROCEDURE — 99214 OFFICE O/P EST MOD 30 MIN: CPT | Performed by: PHYSICIAN ASSISTANT

## 2020-06-15 RX ORDER — METHYLPREDNISOLONE 4 MG/1
TABLET ORAL
Qty: 1 KIT | Refills: 0 | Status: SHIPPED | OUTPATIENT
Start: 2020-06-15

## 2020-06-16 NOTE — PROGRESS NOTES
tablet by mouth 2 times daily Please avoid missing doses. 60 tablet 0    Ascorbic Acid (VITAMIN C) 100 MG CHEW Take 1 tablet by mouth daily      Calcium Carb-Cholecalciferol (CALCIUM 600+D3) 600-800 MG-UNIT TABS Take 1 tablet by mouth nightly      Multiple Vitamins-Minerals (THERAPEUTIC MULTIVITAMIN-MINERALS) tablet Take 1 tablet by mouth daily      Abacavir-Dolutegravir-Lamivud (TRIUMEQ PO) Take 1 tablet by mouth daily        No current facility-administered medications for this visit. Objective:   He  is alert, oriented x 3, pleasant, well nourished, developed and in no acute distress. Temp 98.2 °F (36.8 °C) (Temporal)      HIP EXAM:  Examination of the left hip shows: There is no pain with internal and external rotation. There is no pain with flexion and extension. There is no pain with active SLR. Leg lengths: equal  There is no pain with weight bearing. Examination of the right hip shows: There is no pain with internal and external rotation. There is no pain with flexion and extension. There is no pain with active SLR. Leg lengths: equal  There is no pain with weight bearing. Examination of the lower extremities are intact with sensation to light touch. Motor testing  5/5 in all major motor groups of the lower extremities. Gait is normal heel to toe. Gait is antalgic. Negative Escalona's Sign. SLR negative. Examination of the lower extremities shows intact perfusion to all extremities. No cyanosis. Digits are warm to touch, capillary refill is less than 2 seconds. There is mild edema noted BLE     Examination of the skin over both lower extremities reveals: The skin to be intact without lacerations or abrasions. No significant erythema. No rashes or skin lesions. X Rays: performed in the office today:     AP Pelvis, AP and Frog Leg Lateral Left and Right Hip: There is a left and right total hip arthroplasty present. The alignment is satisfactory.

## 2020-06-22 ENCOUNTER — OFFICE VISIT (OUTPATIENT)
Dept: ORTHOPEDIC SURGERY | Age: 65
End: 2020-06-22
Payer: MEDICARE

## 2020-06-22 VITALS — TEMPERATURE: 98.2 F

## 2020-06-22 PROCEDURE — 99213 OFFICE O/P EST LOW 20 MIN: CPT | Performed by: ORTHOPAEDIC SURGERY

## 2020-06-30 ENCOUNTER — TELEPHONE (OUTPATIENT)
Dept: ORTHOPEDIC SURGERY | Age: 65
End: 2020-06-30

## 2021-04-28 ENCOUNTER — TELEPHONE (OUTPATIENT)
Dept: ORTHOPEDIC SURGERY | Age: 66
End: 2021-04-28

## 2021-05-12 ENCOUNTER — OFFICE VISIT (OUTPATIENT)
Dept: ORTHOPEDIC SURGERY | Age: 66
End: 2021-05-12
Payer: MEDICARE

## 2021-05-12 VITALS — BODY MASS INDEX: 31.08 KG/M2 | HEIGHT: 67 IN | WEIGHT: 198 LBS

## 2021-05-12 DIAGNOSIS — R93.89 ABNORMAL X-RAY: ICD-10-CM

## 2021-05-12 DIAGNOSIS — M25.531 RIGHT WRIST PAIN: ICD-10-CM

## 2021-05-12 DIAGNOSIS — M25.562 LEFT KNEE PAIN, UNSPECIFIED CHRONICITY: Primary | ICD-10-CM

## 2021-05-12 DIAGNOSIS — M17.12 ARTHRITIS OF LEFT KNEE: ICD-10-CM

## 2021-05-12 PROCEDURE — 3017F COLORECTAL CA SCREEN DOC REV: CPT | Performed by: PHYSICIAN ASSISTANT

## 2021-05-12 PROCEDURE — G8417 CALC BMI ABV UP PARAM F/U: HCPCS | Performed by: PHYSICIAN ASSISTANT

## 2021-05-12 PROCEDURE — L3908 WHO COCK-UP NONMOLDE PRE OTS: HCPCS | Performed by: PHYSICIAN ASSISTANT

## 2021-05-12 PROCEDURE — 4040F PNEUMOC VAC/ADMIN/RCVD: CPT | Performed by: PHYSICIAN ASSISTANT

## 2021-05-12 PROCEDURE — 1036F TOBACCO NON-USER: CPT | Performed by: PHYSICIAN ASSISTANT

## 2021-05-12 PROCEDURE — 20610 DRAIN/INJ JOINT/BURSA W/O US: CPT | Performed by: PHYSICIAN ASSISTANT

## 2021-05-12 PROCEDURE — 1123F ACP DISCUSS/DSCN MKR DOCD: CPT | Performed by: PHYSICIAN ASSISTANT

## 2021-05-12 PROCEDURE — G8427 DOCREV CUR MEDS BY ELIG CLIN: HCPCS | Performed by: PHYSICIAN ASSISTANT

## 2021-05-12 PROCEDURE — 99214 OFFICE O/P EST MOD 30 MIN: CPT | Performed by: PHYSICIAN ASSISTANT

## 2021-05-12 NOTE — PROGRESS NOTES
Medications   Medication Sig Dispense Refill    methylPREDNISolone (MEDROL, ONI,) 4 MG tablet Take by mouth. 6 po day one 5 po day 2 4 po day 3 3 po day 4 2 po day 5 1 po day 6. 1 kit 0    baclofen (LIORESAL) 10 MG tablet Take 1 tablet by mouth daily 45 tablet 0    gabapentin (NEURONTIN) 300 MG capsule Take 1 capsule by mouth 3 times daily for 10 days. . 30 capsule 0    aspirin EC 81 MG EC tablet Take 1 tablet by mouth 2 times daily Please avoid missing doses. 60 tablet 0    Ascorbic Acid (VITAMIN C) 100 MG CHEW Take 1 tablet by mouth daily      Calcium Carb-Cholecalciferol (CALCIUM 600+D3) 600-800 MG-UNIT TABS Take 1 tablet by mouth nightly      Multiple Vitamins-Minerals (THERAPEUTIC MULTIVITAMIN-MINERALS) tablet Take 1 tablet by mouth daily      Abacavir-Dolutegravir-Lamivud (TRIUMEQ PO) Take 1 tablet by mouth daily        No current facility-administered medications for this visit. Objective:     He is alert, oriented x 3, pleasant, well nourished, developed and in no   acute distress. Ht 5' 7\" (1.702 m)   Wt 198 lb (89.8 kg)   BMI 31.01 kg/m²      Examination of the left knee: Inspection of the skin no abrasions or skin lesions. Inspection of the soft tissues minimal swelling, no erythema. The overall alignment of the knee is neutral.  Gait is mildly antalgic. There is minimal intra-articular effusion. AROM:           PROM:  Extension-         0                   0  Flexion -           125                   125  There mild pain associated with ROM testing. Medial joint line is tender to palpation. Lateral joint line is somewhat tender to palpation. Pes anserine bursa is not tender to palpation. Patellar tendon is not tender to palpation. Quadriceps tendon is not tender to palpation. Collateral ligaments is not tender to palpation. Popliteal fossa is tender to palpation. Varus Stress testing negative for pain or crepitus.   Valgus Stress testing 2/4 Kellgren and Tim Classification. PF compartment-          3/4 Kellgren and Tim Classification. AP, Lateral and Oblique of the Right Wrist:  Mild degenerative changes of the carpals. There is no radiographic evidence of a fracture or dislocation. There are multiple small lytic/ cystic changes within the distal radius, ulna. Additional Tests reviewed: None. Additional Outside Records reviewed: None. Diagnosis:       ICD-10-CM    1. Left knee pain, unspecified chronicity  M25.562 XR KNEE LEFT (3 VIEWS)   2. Right wrist pain  M25.531 XR WRIST RIGHT (MIN 3 VIEWS)   3. Arthritis of left knee  M17.12 MA ARTHROCENTESIS ASPIR&/INJ MAJOR JT/BURSA W/O US     MA TRIAMCINOLONE ACETONIDE INJ   4. Abnormal x-ray  R93.89 NM BONE SCAN 3 PHASE     Kiara Poean Wrist Short Brace        Assessment and Plan:       Assessment:  Left knee pain felt to be related to left knee osteoarthritis. Right wrist pain felt to be tendinitis. However, does have an abnormal x-ray changes with small lytic cyst within the distal radius and ulna. Plan:  Medications- OTC NSAIDS discussed. He  was advised that NSAID-type medications have two very important potential side effects: gastrointestinal irritation including hemorrhage and renal injuries. He was asked to take the medication with food and to stop if he experiences any GI upset. I asked him to call for vomiting, abdominal pain or black/bloody stools. He should have renal function testing per his medical provider periodically. The patient expresses understanding of these issues and questions were answered. PT- A home exercise program was instructed today including ROM exercises and strengthening exercises. The patient verbalized understanding of these exercises as well as the importance of the exercise program to promote return of normal function. If pain intensifies or other problems arise you are to notify the office.        Further Imaging-whole-body bone scan recommended. Procedures- Risk and benefits of corticosteroid intra-articular injection was discussed today. All questions were answered to his satisfaction. He verbally consented to proceed with intra-articular injection today. Cortisone Injection                                                       PROCEDURE NOTE:     Pre op Diagnosis:  left knee pain     Post op Diagnosis: Same  With the Carol Kammoises permission, his left knee was prepped  in standard sterile fashion with  Alcohol and 2 cc of 0.25% Marcaine and 1 cc of Kenalog 40 mg was injected into the left lateral compartment  without difficulty. The patient tolerated this well without difficulty. A band-aid was applied. The patient was advised to ice the knee for 15-20 minutes to relieve any injection site related pain. I have recommended immobilization for the right wrist for the tendinitis. Procedures    KS ARTHROCENTESIS ASPIR&/INJ MAJOR JT/BURSA W/O US    KS TRIAMCINOLONE ACETONIDE INJ    Kiara Treviño Titan Wrist Short Brace     Patient was prescribed a Kiara Treviño Titan Wrist Orthosis. The right wrist will require stabilization / immobilization from this semi-rigid / rigid orthosis to improve their function. The orthosis will assist in protecting the affected area, provide functional support and facilitate healing. The patient was educated and fit by a healthcare professional with expert knowledge and specialization in brace application while under the direct supervision of the treating physician. Verbal and written instructions for the use of and application of this item were provided. They were instructed to contact the office immediately should the brace result in increased pain, decreased sensation, increased swelling or worsening of the condition. Follow up- 1 week to review bone scan results. Call or return to clinic if these symptoms worsen or fail to improve as anticipated.

## 2021-05-20 ENCOUNTER — OFFICE VISIT (OUTPATIENT)
Dept: ORTHOPEDIC SURGERY | Age: 66
End: 2021-05-20
Payer: MEDICARE

## 2021-05-20 DIAGNOSIS — R93.89 ABNORMAL X-RAY: ICD-10-CM

## 2021-05-20 DIAGNOSIS — M25.531 RIGHT WRIST PAIN: Primary | ICD-10-CM

## 2021-05-20 PROCEDURE — 1123F ACP DISCUSS/DSCN MKR DOCD: CPT | Performed by: PHYSICIAN ASSISTANT

## 2021-05-20 PROCEDURE — 4040F PNEUMOC VAC/ADMIN/RCVD: CPT | Performed by: PHYSICIAN ASSISTANT

## 2021-05-20 PROCEDURE — 3017F COLORECTAL CA SCREEN DOC REV: CPT | Performed by: PHYSICIAN ASSISTANT

## 2021-05-20 PROCEDURE — G8427 DOCREV CUR MEDS BY ELIG CLIN: HCPCS | Performed by: PHYSICIAN ASSISTANT

## 2021-05-20 PROCEDURE — G8417 CALC BMI ABV UP PARAM F/U: HCPCS | Performed by: PHYSICIAN ASSISTANT

## 2021-05-20 PROCEDURE — 99213 OFFICE O/P EST LOW 20 MIN: CPT | Performed by: PHYSICIAN ASSISTANT

## 2021-05-20 PROCEDURE — 1036F TOBACCO NON-USER: CPT | Performed by: PHYSICIAN ASSISTANT

## 2021-05-20 NOTE — PROGRESS NOTES
Calcium Carb-Cholecalciferol (CALCIUM 600+D3) 600-800 MG-UNIT TABS Take 1 tablet by mouth nightly      Multiple Vitamins-Minerals (THERAPEUTIC MULTIVITAMIN-MINERALS) tablet Take 1 tablet by mouth daily      Abacavir-Dolutegravir-Lamivud (TRIUMEQ PO) Take 1 tablet by mouth daily        No current facility-administered medications for this visit. Objective:     He is alert, oriented x 3, pleasant, well nourished, developed and in no   acute distress. There were no vitals taken for this visit. Right Wrist and Hand Exam:  There is mild swelling over the MCP joints. There is no skeletal deformity. There is minimal tenderness to palpation over the wrist.  More tender over the ulnar styloid than the distal radius. Wrist range of motion is not limited by pain. Digital range of motion is not limited by pain and swelling. FDS,FDP and Common Extensor tendon function is intact to each digit. FPL and EPL tendon function is intact to the thumb. Phalen's test negative. Tinel's test negative. Finkelstein's test negative. Skin color, texture, turgor is normal.  No rashes or lesions found of the injured extremity. Vascular exam shows normal  And good capillary refill bilaterally. Sensation is subjectively normal in the whole hand. Digits are normally sensate. X Rays: not performed in the office today:       Diagnosis:       ICD-10-CM    1. Right wrist pain  M25.531    2. Abnormal x-ray  R93.89         Assessment and Plan:       Assessment:  Continue right wrist pain. X-rays concerning for some small lytic cystic changes in the distal radius and ulna. He was encouraged to obtain bone scan and return to the office to review those results. Continue splinting. Continue Tylenol for pain. Follow up- 1-2 days after Bone Scan   Call or return to clinic if these symptoms worsen or fail to improve as anticipated.

## 2021-05-27 ENCOUNTER — HOSPITAL ENCOUNTER (OUTPATIENT)
Dept: NUCLEAR MEDICINE | Age: 66
Discharge: HOME | End: 2021-05-27
Payer: MEDICARE

## 2021-05-27 DIAGNOSIS — R93.89 ABNORMAL X-RAY: ICD-10-CM

## 2021-05-27 PROCEDURE — A9503 TC99M MEDRONATE: HCPCS | Performed by: PHYSICIAN ASSISTANT

## 2021-05-27 PROCEDURE — 3430000000 HC RX DIAGNOSTIC RADIOPHARMACEUTICAL: Performed by: PHYSICIAN ASSISTANT

## 2021-05-27 PROCEDURE — 78315 BONE IMAGING 3 PHASE: CPT

## 2021-05-27 RX ORDER — TC 99M MEDRONATE 20 MG/10ML
25 INJECTION, POWDER, LYOPHILIZED, FOR SOLUTION INTRAVENOUS
Status: COMPLETED | OUTPATIENT
Start: 2021-05-27 | End: 2021-05-27

## 2021-05-27 RX ADMIN — TC 99M MEDRONATE 25 MILLICURIE: 20 INJECTION, POWDER, LYOPHILIZED, FOR SOLUTION INTRAVENOUS at 09:24

## 2021-06-02 ENCOUNTER — CLINICAL DOCUMENTATION (OUTPATIENT)
Dept: OTHER | Age: 66
End: 2021-06-02

## 2022-12-20 NOTE — PLAN OF CARE
Outpatient Physical Therapy  [x] [facility]    Phone: [phone]   Fax: [phone]   [] [facility]  Phone: [phone]              Fax: [phone]  [] [facility]   Phone: [phone]   Fax: [phone]     To: Referring Practitioner: Jaime Mckeon MD      Patient: Nia Woods   : 1955   MRN: [id number]  Evaluation Date: 2019      Diagnosis Information:  · Diagnosis: history of B THR, hip pain   · Treatment Diagnosis: Decreased strength and mobility     Physical Therapy Certification/Re-Certification Form  Dear Dr. Mayela Ewing,   The following patient has been evaluated for physical therapy services and for therapy to continue, Medicare requires monthly physician review of the treatment plan. Please review the attached evaluation and/or summary of the patient's plan of care, and verify that you agree therapy should continue by signing the attached document and sending it back to our office. Plan of Care/Treatment to date:  [x] Therapeutic Exercise    [x] Modalities:  [x] Therapeutic Activity     [x] Ultrasound  [x] Electrical Stimulation  [] Gait Training      [] Cervical Traction [] Lumbar Traction  [] Neuromuscular Re-education    [x] Cold/hotpack [] Iontophoresis   [x] Instruction in HEP     Other:  [x] Manual Therapy      []             [x] Aquatic Therapy      []           ? Frequency/Duration:  # Days per week: [] 1 day # Weeks: [] 1 week [] 5 weeks     [x] 2 days? [] 2 weeks [x] 6 weeks     [] 3 days   [] 3 weeks [] 7 weeks     [] 4 days   [x] 4 weeks [] 8 weeks    Rehab Potential: [x] Excellent [] Good [] Fair  [] Poor       Electronically signed by:  Paz Diaz, [id number] [facility]      If you have any questions or concerns, please don't hesitate to call.   Thank you for your referral.      Physician Signature:________________________________Date:__________________  By signing above, therapists plan is approved by physician Procedure (Limit To 20 Characters): 10624, 45443, 82068 Procedure (Limit To 20 Characters): 08864, 02964, 86036

## 2023-02-27 NOTE — DISCHARGE SUMMARY
"  SUBJECTIVE:   Deepak Mckay is a 82 year old male who presents for Preventive Visit.    {Split Bill scripting  The purpose of this visit is to discuss your medical history and prevent health problems before you are sick. You may be responsible for a co-pay, coinsurance, or deductible if your visit today includes services such as checking on a sore throat, having an x-ray or lab test, or treating and evaluating a new or existing condition :710039}  {Patient advised of split billing (Optional):403135}  Are you in the first 12 months of your Medicare Part B coverage?  { :842697::\"No\"}    Physical Health:    In general, how would you rate your overall physical health? { :472001}    Outside of work, how many days during the week do you exercise? { :717384}    Outside of work, approximately how many minutes a day do you exercise?{ :171925}    If you drink alcohol do you typically have >3 drinks per day or >7 drinks per week? { :986063}    Do you usually eat at least 4 servings of fruit and vegetables a day, include whole grains & fiber and avoid regularly eating high fat or \"junk\" foods? { :561127::\"Yes\"}    Do you have any problems taking medications regularly?  { :946122::\"No\"}    Do you have any side effects from medications? { :999057}    Needs assistance for the following daily activities: { :191033}    Which of the following safety concerns are present in your home?  { :884214::\"none identified\"}     Hearing impairment: { :251256}    In the past 6 months, have you been bothered by leaking of urine? { :946694}    Mental Health:    In general, how would you rate your overall mental or emotional health? { :083489}  PHQ-2 Score: 0    Do you feel safe in your environment? { :035815}    Have you ever done Advance Care Planning? (For example, a Health Directive, POLST, or a discussion with a medical provider or your loved ones about your wishes): { :758277}    Additional concerns to address?  {If YES, notify patient " Physical Therapy Discharge Note  Date: 2019    Patient Name: Iliana Ashley  : 1955  MRN: [id number]    · Diagnosis: history of B THR, hip pain      Referring Physician: Dr. Osbaldo Durand    Dates of Service: 19-19  Total # of Visits: 8     Treatment to Date:  [] Therapeutic Exercise  [] Modalities:  [x] Therapeutic Activity     [] Ultrasound  [] Electrical Stim   [] Gait Training      [] Cervical Traction    [] Lumbar Traction  [] Neuromuscular Re-education  [] Cold/hotpack [] Iontophoresis  [x] Instruction in HEP      Other:  [] Manual Therapy       []    [x] Aquatic Therapy       []                    ? Significant Findings At Last Visit:          Pain 3/10 with active hip flex or abd (3/5 limited by pain)   Amb with a cane in the community   Independent with home exer          Goal Status:  [] Achieved [x] Partially Achieved  [] Not Achieved     Reason for Discharge:  [] Goals Met   [] Patient did not return after initial evaluation   [x] Progress Plateaued [] Missed _____ scheduled appointments   [] No insurance coverage [] Patient terminated therapy   [] Other:        PT recommendation:   [x] Patient now discharged with HEP      [] Other:    Discharge discussed with:  [x] Patient  [] Caregiver      [] Other        Electronically signed by:  Hilma Runner [id number]    If you have any questions or concerns, please don't hesitate to call.   Thank you for your referral. "they may be responsible for a copay, coinsurance or deductible if the visit today includes services such as checking on a sore throat, having an x-ray or lab test, or treating and evaluating a new or existing condition :143879::\"No\"}    Fall risk:  { :540745}  {If any of the above assessments are answered yes, consider ordering appropriate referrals (Optional):525615::\"click delete button to remove this line now\"}  Cognitive Screening: { :952655}    {Do you have sleep apnea, excessive snoring or daytime drowsiness? (Optional):450852}    {Outside tests to abstract? :288297}    {additional problems to add (Optional):835872}    Reviewed and updated as needed this visit by clinical staff   Tobacco  Allergies  Meds  Problems  Med Hx  Surg Hx  Fam Hx          Reviewed and updated as needed this visit by Provider                 Social History     Tobacco Use     Smoking status: Former     Types: Cigarettes     Quit date: 1980     Years since quittin.1     Smokeless tobacco: Never     Tobacco comments:     Pipie smoker, quit  max 1/2ppd cigarettes since    Substance Use Topics     Alcohol use: Yes     Alcohol/week: 1.7 standard drinks     Types: 2 Standard drinks or equivalent per week     Comment: 1 drink a week                           Current providers sharing in care for this patient include: {Rooming staff:  Please update Care Team in Rooming Activity, refresh this note and then delete this statement}  Patient Care Team:  Gaby Richmond MD as PCP - General (Internal Medicine)  Philippe Hernandez MD as Assigned Surgical Provider  Gaby Richmond MD as Assigned PCP    The following health maintenance items are reviewed in Epic and correct as of today:  Health Maintenance   Topic Date Due     DTAP/TDAP/TD IMMUNIZATION (4 - Td or Tdap) 12/15/2021     ANNUAL REVIEW OF HM ORDERS  2023     MEDICARE ANNUAL WELLNESS VISIT  2024     FALL RISK ASSESSMENT  2024     " "ADVANCE CARE PLANNING  2027     SPIROMETRY  Completed     COPD ACTION PLAN  Completed     PHQ-2 (once per calendar year)  Completed     INFLUENZA VACCINE  Completed     Pneumococcal Vaccine: 65+ Years  Completed     ZOSTER IMMUNIZATION  Completed     COVID-19 Vaccine  Completed     IPV IMMUNIZATION  Aged Out     MENINGITIS IMMUNIZATION  Aged Out     {Chronicprobdata (Optional):122342}  {Decision Support (Optional):149301}    ROS:  {ROS COMP:659196}    OBJECTIVE:   /70 (BP Location: Right arm, Patient Position: Sitting, Cuff Size: Adult Regular)   Pulse 84   Temp 97.9  F (36.6  C) (Oral)   Resp 16   Ht 1.645 m (5' 4.75\")   Wt 65.2 kg (143 lb 11.2 oz)   SpO2 95%   BMI 24.10 kg/m   Estimated body mass index is 24.1 kg/m  as calculated from the following:    Height as of this encounter: 1.645 m (5' 4.75\").    Weight as of this encounter: 65.2 kg (143 lb 11.2 oz).  EXAM:   {Exam :548285}    {Diagnostic Test Results (Optional):261406::\"Diagnostic Test Results:\",\"Labs reviewed in Epic\"}    ASSESSMENT / PLAN:   {Diag Picklist:220046}    {Patient advised of split billing (Optional):105067}    COUNSELING:  {Medicare Counselin}    Estimated body mass index is 24.1 kg/m  as calculated from the following:    Height as of this encounter: 1.645 m (5' 4.75\").    Weight as of this encounter: 65.2 kg (143 lb 11.2 oz).    {Weight Management Plan (ACO) Complete if BMI is abnormal-  Ages 18-64  BMI >24.9.  Age 65+ with BMI <23 or >30 (Optional):661853}    He reports that he quit smoking about 43 years ago. He has never used smokeless tobacco.    Appropriate preventive services were discussed with this patient, including applicable screening as appropriate for cardiovascular disease, diabetes, osteopenia/osteoporosis, and glaucoma.  As appropriate for age/gender, discussed screening for colorectal cancer, prostate cancer, breast cancer, and cervical cancer. Checklist reviewing preventive services available has " been given to the patient.    Reviewed patients plan of care and provided an AVS. The {CarePlan:530333} for Deepak meets the Care Plan requirement. This Care Plan has been established and reviewed with the {PATIENT, FAMILY MEMBER, CAREGIVER:584203}.    Counseling Resources:  ATP IV Guidelines  Pooled Cohorts Equation Calculator  Breast Cancer Risk Calculator  BRCA-Related Cancer Risk Assessment: FHS-7 Tool  FRAX Risk Assessment  ICSI Preventive Guidelines  Dietary Guidelines for Americans, 2010  USDA's MyPlate  ASA Prophylaxis  Lung CA Screening    Gaby Richmond MD  [facility]

## 2023-03-16 NOTE — PROGRESS NOTES
Cognition  Overall Cognitive Status: WFL  Cognition Comment: cues for hand placement and safety            Assessment   Performance deficits / Impairments: Decreased functional mobility ; Decreased ADL status; Decreased ROM; Decreased endurance;Decreased balance  Assessment: Discussed with OTR am pac score is 17 which indicates need for continued skilled OT to increase Shaniko and return to PLOF. Patient completes transfers with CGA and cues for hand placement, functional mobility with RW with CGA and cues for safety   Patient Education: Role of OT, POC, transfer training, hip precautions  REQUIRES OT FOLLOW UP: Yes  Activity Tolerance  Activity Tolerance: Patient Tolerated treatment well  Safety Devices  Safety Devices in place: Yes (GIANA Fitzgerald informed)  Type of devices: Call light within reach; Chair alarm in place;Nurse notified; Left in chair          Plan   Plan  Times per week: 3-5  Times per day: Daily  Current Treatment Recommendations: Strengthening, Endurance Training, Safety Education & Training, Self-Care / ADL, Equipment Evaluation, Education, & procurement, Patient/Caregiver Education & Training    AM-PAC Score        AM-[facility] Inpatient Daily Activity Raw Score: 17  AM-PAC Inpatient ADL T-Scale Score : 37.26  ADL Inpatient CMS 0-100% Score: 50.11  ADL Inpatient CMS G-Code Modifier : CK    Goals  Short term goals  Time Frame for Short term goals: By d/c: All goals ongoing   Short term goal 1: Pt will complete fxl mobility and fxl transfers to/from ADL surfaces with SBA and use of AD  Short term goal 2: Pt will complete LB bathing/dressing with CGA and use of AE  Short term goal 3: Pt will complete toileting with SBA  Short term goal 4: Pt will tolerate 5-8 minutes of standing functional activity with SBA  Patient Goals   Patient goals : \"to get back to fishing and bowling\"       Therapy Time   Individual Concurrent Group Co-treatment   Time In 0810         Time Out 0910         Minutes 60 Complex Repair And Skin Substitute Graft Text: The defect edges were debeveled with a #15 scalpel blade.  The primary defect was closed partially with a complex linear closure.  Given the location of the remaining defect, shape of the defect and the proximity to free margins a skin substitute graft was deemed most appropriate to repair the remaining defect.  The graft was trimmed to fit the size of the remaining defect.  The graft was then placed in the primary defect, oriented appropriately, and sutured into place.

## 2024-02-02 ENCOUNTER — TELEPHONE (OUTPATIENT)
Dept: ORTHOPEDIC SURGERY | Age: 69
End: 2024-02-02

## 2024-02-02 NOTE — TELEPHONE ENCOUNTER
LM asking patient to call back.    He is currently scheduled 2/5 with [facility] for his left hip. However, patient has a history of Bilateral LASHAUN with Dr NATACHA Valdez and has established care with Joel Galicia. [facility] does not treat patients with total joints.    Patient needs to be rescheduled with Judd Galicia.

## 2024-02-05 ENCOUNTER — OFFICE VISIT (OUTPATIENT)
Dept: ORTHOPEDIC SURGERY | Age: 69
End: 2024-02-05
Payer: MEDICARE

## 2024-02-05 VITALS — BODY MASS INDEX: 31.01 KG/M2 | HEIGHT: 67 IN

## 2024-02-05 DIAGNOSIS — Z96.642 H/O TOTAL HIP ARTHROPLASTY, LEFT: ICD-10-CM

## 2024-02-05 DIAGNOSIS — M25.552 LEFT HIP PAIN: Primary | ICD-10-CM

## 2024-02-05 DIAGNOSIS — M76.892 HIP FLEXOR TENDINITIS, LEFT: ICD-10-CM

## 2024-02-05 PROCEDURE — G8427 DOCREV CUR MEDS BY ELIG CLIN: HCPCS | Performed by: PHYSICIAN ASSISTANT

## 2024-02-05 PROCEDURE — G8417 CALC BMI ABV UP PARAM F/U: HCPCS | Performed by: PHYSICIAN ASSISTANT

## 2024-02-05 PROCEDURE — 99213 OFFICE O/P EST LOW 20 MIN: CPT | Performed by: PHYSICIAN ASSISTANT

## 2024-02-05 PROCEDURE — 3017F COLORECTAL CA SCREEN DOC REV: CPT | Performed by: PHYSICIAN ASSISTANT

## 2024-02-05 PROCEDURE — 1036F TOBACCO NON-USER: CPT | Performed by: PHYSICIAN ASSISTANT

## 2024-02-05 PROCEDURE — G8484 FLU IMMUNIZE NO ADMIN: HCPCS | Performed by: PHYSICIAN ASSISTANT

## 2024-02-05 PROCEDURE — 1123F ACP DISCUSS/DSCN MKR DOCD: CPT | Performed by: PHYSICIAN ASSISTANT

## 2024-02-05 NOTE — PROGRESS NOTES
Subjective:      Patient ID: Jose Vivas is a 68 y.o. male who is here for an initial evaluation of left anterior hip pain.    HPI:   He has a history of left hip arthroplasty performed 10/31/2018.  He reports falling while fishing in May of June 2023 and he landed on his left hip.  He has had intermittent pain on the left hip.  Over the last several weeks the left groin pain has become more intense.  He reports difficulty raising his left leg to get in or out of a car.  He denies any pain with weightbearing.    Pain Scale 0-10/10 VAS.  Location of pain left anterior hip/groin.  Pain is worse with active hip flexion.   Pain improves with rest.   Previous treatments have included Tylenol.     Review of Systems:  I have reviewed the clinically relevant past medical history, medications, allergies, family history, social history, and 13 point Review of Systems from the patient's recent history form & documented any details relevant to today's presenting complaints in the history above. The patient's self-reported past medical history, medications, allergies, family history, social history, and Review of Systems form from today's date have been scanned into the chart under the \"Media\" tab.    As outlined in the HPI.  Negative for poly-joint pain, swelling and stiffness.  Negative numbness or tingling into the affected extremity.     Past Medical History:   Diagnosis Date    Arthritis     HIV (human immunodeficiency virus infection) (HCC)        Family History   Problem Relation Age of Onset    Cancer Neg Hx        Past Surgical History:   Procedure Laterality Date    COLONOSCOPY      FINGER SURGERY Left     broken left pinky    JOINT REPLACEMENT Right 08/07/2018    THR    [place] ARTHRP ACETBLR/PROX FEM PROSTC AGRFT/ALGRFT Left 10/31/2018    LEFT LATERAL TOTAL HIP REPLACEMENT performed by Rodger Valdez MD at [email or web address] OR       Social History     Occupational History    Not on file   Tobacco Use    Smoking status: Former

## 2024-02-06 ENCOUNTER — HOSPITAL ENCOUNTER (OUTPATIENT)
Dept: PHYSICAL THERAPY | Age: 69
Setting detail: THERAPIES SERIES
Discharge: HOME | End: 2024-02-06
Payer: MEDICARE

## 2024-02-06 DIAGNOSIS — R52 PAIN: ICD-10-CM

## 2024-02-06 DIAGNOSIS — R26.89 FUNCTIONAL GAIT ABNORMALITY: ICD-10-CM

## 2024-02-06 DIAGNOSIS — R53.1 WEAKNESS: Primary | ICD-10-CM

## 2024-02-06 DIAGNOSIS — M25.60 STIFFNESS IN JOINT: ICD-10-CM

## 2024-02-06 DIAGNOSIS — Z74.09 DECREASED FUNCTIONAL MOBILITY AND ENDURANCE: ICD-10-CM

## 2024-02-06 PROCEDURE — 97110 THERAPEUTIC EXERCISES: CPT

## 2024-02-06 PROCEDURE — 97161 PT EVAL LOW COMPLEX 20 MIN: CPT

## 2024-02-06 PROCEDURE — 97530 THERAPEUTIC ACTIVITIES: CPT

## 2024-02-06 NOTE — FLOWSHEET NOTE
to work towards return to prior level of function.  Status: [] Progressing: [] Met: [] Not Met: [] Adjusted  Patient will perform normal ADLs without symptoms >50% of the time                                                                                                                Status: [] Progressing: [] Met: [] Not Met: [] Adjusted    Overall Progression Towards Functional goals/ Treatment Progress Update:  [] Patient is progressing as expected towards functional goals listed.    [] Progression is slowed due to complexities/Impairments listed.  [] Progression has been slowed due to co-morbidities.  [x] Plan just implemented, too soon (<30days) to assess goals progression   [] Goals require adjustment due to lack of progress  [] Patient is not progressing as expected and requires additional follow up with physician  [] Other:     CHARGE CAPTURE     PT CHARGE GRID   CPT Code (TIMED) minutes # CPT Code (UNTIMED) #     Therex (15829)  8 1  EVAL:LOW (16501 - Typically 20 minutes face-to-face) 1    Neuromusc. Re-ed (63868)    Re-Eval (85574)     Manual (83262) 2   Estim Unattended (01513)     Ther. Act (64591) 10 1  Mech. Traction (11449)     Gait (73129)    Dry Needle 1-2 muscle (57661)     Aquatic Therex (65970)    Dry Needle 3+ muscle (20561)     Iontophoresis (91175)    VASO (52150)     Ultrasound (88281)    Group Therapy (77188)     Estim Attended (71805)    Canalith Repositioning (46135)     Other:    Other:    Total Timed Code Tx Minutes 20 2  1     Total Treatment Minutes 40 + 15        Charge Justification:  (41911) THERAPEUTIC EXERCISE - Provided verbal/tactile cueing for activities related to strengthening, flexibility, endurance, ROM performed to prevent loss of range of motion, maintain or improve muscular strength or increase flexibility, following either an injury or surgery.   (34082) HOME EXERCISE PROGRAM - Reviewed/Progressed HEP activities related to strengthening, flexibility, endurance, ROM

## 2024-02-06 NOTE — PLAN OF CARE
strength to 4+/5 or better of proximal hipto allow for proper muscle and joint use in functional mobility, ADLs and prior level of function  Status: [] Progressing: [] Met: [] Not Met: [] Adjusted  Patient will return to getting in/out of a car without increased symptoms or restriction to work towards return to prior level of function.  Status: [] Progressing: [] Met: [] Not Met: [] Adjusted  Patient will perform normal ADLs without symptoms >50% of the time             Status: [] Progressing: [] Met: [] Not Met: [] Adjusted    TREATMENT PLAN     Frequency/Duration: 2-3x/week for 4-6 weeks for the following treatment interventions:    Interventions:  [x] Therapeutic exercise including: strength training, ROM, including postural re-education.   [x] NMR activation and proprioception, including postural re-education.    [x] Manual therapy as indicated to include: PROM, Gr I-IV mobilizations, and STM  [x] Modalities as needed that may include: Cryotherapy, Thermal Agents, Ultrasound, and Iontophoresis  [x] Patient education on joint protection, postural re-education, activity modification, progression of HEP.        [] Aquatic Therapy     HEP instruction: Refer to HEP instructions outlined on the flowsheet on 02/06/2024.  Access Code: [email or web address]  URL: [email or web address]  Date: 02/06/2024  Prepared by: Farzana Bentley    Exercises  - Hooklying Single Knee to Chest Stretch  - 2 x daily - 7 x weekly - 1 sets - 5 reps - 10 sec  hold  - Supine Hamstring Stretch  - 2 x daily - 7 x weekly - 1 sets - 5 reps - 10 sec  hold    Electronically Signed by Farzana Bentley, PT MPT [id number] Date: 02/06/2024

## 2024-02-08 ENCOUNTER — HOSPITAL ENCOUNTER (OUTPATIENT)
Dept: PHYSICAL THERAPY | Age: 69
Setting detail: THERAPIES SERIES
Discharge: HOME | End: 2024-02-08
Payer: MEDICARE

## 2024-02-08 PROCEDURE — 97110 THERAPEUTIC EXERCISES: CPT

## 2024-02-08 PROCEDURE — 97140 MANUAL THERAPY 1/> REGIONS: CPT

## 2024-02-08 NOTE — FLOWSHEET NOTE
contact, billed in 15-minute increments.  (85072) MANUAL THERAPY -  Manual therapy techniques, 1 or more regions, each 15 minutes (Mobilization/manipulation, manual lymphatic drainage, manual traction) for the purpose of modulating pain, promoting relaxation,  increasing ROM, reducing/eliminating soft tissue swelling/inflammation/restriction, improving soft tissue extensibility and allowing for proper ROM for normal function with self care, mobility, lifting and ambulation    TREATMENT PLAN   Plan: Cont POC- Continue emphasis/focus on improving proper muscle recruitment and activation/motor control patterns, modulating pain, and increasing ROM. Next visit plan to progress as tolerated/indicated      Electronically Signed by Farzana Bentley, PT      MPT [id number]        Date: 02/08/2024     Note: If patient does not return for scheduled/recommended follow up visits, this note will serve as a discharge from care along with the most recent update on progress.

## 2024-02-13 ENCOUNTER — HOSPITAL ENCOUNTER (OUTPATIENT)
Dept: PHYSICAL THERAPY | Age: 69
Setting detail: THERAPIES SERIES
End: 2024-02-13
Payer: MEDICARE

## 2024-02-15 ENCOUNTER — HOSPITAL ENCOUNTER (OUTPATIENT)
Dept: PHYSICAL THERAPY | Age: 69
Setting detail: THERAPIES SERIES
Discharge: HOME | End: 2024-02-15
Payer: MEDICARE

## 2024-02-15 PROCEDURE — 97035 APP MDLTY 1+ULTRASOUND EA 15: CPT

## 2024-02-15 PROCEDURE — 97140 MANUAL THERAPY 1/> REGIONS: CPT

## 2024-02-15 PROCEDURE — 97110 THERAPEUTIC EXERCISES: CPT

## 2024-02-15 NOTE — FLOWSHEET NOTE
Progression has been slowed due to co-morbidities.  [x] Plan just implemented, too soon (<30days) to assess goals progression   [] Goals require adjustment due to lack of progress  [] Patient is not progressing as expected and requires additional follow up with physician  [] Other:     CHARGE CAPTURE     PT CHARGE GRID   CPT Code (TIMED) minutes # CPT Code (UNTIMED) #     Therex (77696)  24 1  EVAL:LOW (94248 - Typically 20 minutes face-to-face)     Neuromusc. Re-ed (19344)    Re-Eval (64148)     Manual (05367) 8 1  Estim Unattended (16929)     Ther. Act (17337)    Mech. Traction (58650)     Gait (35093)    Dry Needle 1-2 muscle (10640)     Aquatic Therex (61393)    Dry Needle 3+ muscle (20561)     Iontophoresis (99335)    VASO (90237)     Ultrasound (88228) 8 1  Group Therapy (87191)     Estim Attended (38121)    Canalith Repositioning (97870)     Other:    Other:    Total Timed Code Tx Minutes 40        Total Treatment Minutes 40 + 15        Charge Justification:  (11095) THERAPEUTIC EXERCISE - Provided verbal/tactile cueing for activities related to strengthening, flexibility, endurance, ROM performed to prevent loss of range of motion, maintain or improve muscular strength or increase flexibility, following either an injury or surgery.   (40265) HOME EXERCISE PROGRAM - Reviewed/Progressed HEP activities related to strengthening, flexibility, endurance, ROM performed to prevent loss of range of motion, maintain or improve muscular strength or increase flexibility, following either an injury or surgery.  (86013) NEUROMUSCULAR RE-EDUCATION - Therapeutic procedure, 1 or more areas, each 15 minutes; neuromuscular reeducation of movement, balance, coordination, kinesthetic sense, posture, and/or proprioception for sitting and/or standing activities  (87524) HOME EXERCISE PROGRAM - Reviewed/Progressed HEP activities related to neuromuscular reeducation of movement, balance, coordination, kinesthetic sense, posture,

## 2024-02-16 ENCOUNTER — APPOINTMENT (OUTPATIENT)
Dept: PHYSICAL THERAPY | Age: 69
End: 2024-02-16
Payer: MEDICARE

## 2024-02-20 ENCOUNTER — HOSPITAL ENCOUNTER (OUTPATIENT)
Dept: PHYSICAL THERAPY | Age: 69
Setting detail: THERAPIES SERIES
Discharge: HOME | End: 2024-02-20
Payer: MEDICARE

## 2024-02-20 PROCEDURE — 97110 THERAPEUTIC EXERCISES: CPT

## 2024-02-20 PROCEDURE — 97035 APP MDLTY 1+ULTRASOUND EA 15: CPT

## 2024-02-20 PROCEDURE — 97140 MANUAL THERAPY 1/> REGIONS: CPT

## 2024-02-20 NOTE — FLOWSHEET NOTE
Summary VAS 0-10/10 2-3/10   Functional questionnaire WOMAC 11    Other:              2/8: not too bad today; some soreness lifting the leg into his car  2/15: thinks he is starting to see some improvements; sneezed the other day and didn't have that sharp pain that he usually does; lifting leg into bed is getting a little easier  2/20: \"My hip is a lot better\" - pt states less pain with sneezing     OBJECTIVE EXAMINATION     Observation: See Eval    Test measurements: See Eval    Exercises/Interventions:     Therapeutic Ex (67105)  resistance Sets/time Reps Notes/Cues/Progressions  L hip pain    NuStep seat 9  L3 x 5 min      Step HSS L  Step Hip flexor stretch for L    30 sec    30 sec  2  2    Standing L ITB str  30 sec  2           resume   Heel slide  1 10    SLR with manual A  1 5 With min A - very sore after    SKC gentle  20 sec 3    LTR  1 10    Pirif str gentle  20 sec 3    HL hip abd with TB  Grn 1 10    Bridge w/ add set   5 sec  10    clamshell Grn 1 10           Manual Intervention (02592)  TIME     STM to ant/lat hip over pants  6 min   supine with LE on wedge    resume                        NMR re-education (60227) resistance Sets/time Reps CUES NEEDED                                      Therapeutic Activity (19366)  Sets/time         See below                                Pt was educated on PT POC, Diagnosis, Prognosis, pathomechanics as well as frequency and duration of scheduling future physical therapy appointments. Time was also taken on this day to answer all patient questions and participation in PT. Reviewed appointment policy in detail with patient and patient verbalized understanding.        Modalities:    Ultrasound: Applied to Hip Left for 8 minutes. Parameters: 50% 1.5 W/cm2 - to grtr troch area; pt supine with LE on wedge (pt wore shorts under his sweats today)  Cold Pack L hip x 15 min supine w/ wedge     Education/Home Exercise Program: Patient HEP program created electronically.

## 2024-02-22 ENCOUNTER — HOSPITAL ENCOUNTER (OUTPATIENT)
Dept: PHYSICAL THERAPY | Age: 69
Setting detail: THERAPIES SERIES
Discharge: HOME | End: 2024-02-22
Payer: MEDICARE

## 2024-02-22 PROCEDURE — 97035 APP MDLTY 1+ULTRASOUND EA 15: CPT

## 2024-02-22 PROCEDURE — 97110 THERAPEUTIC EXERCISES: CPT

## 2024-02-22 PROCEDURE — 97140 MANUAL THERAPY 1/> REGIONS: CPT

## 2024-02-22 NOTE — FLOWSHEET NOTE
[facility]- Out[facility] Therapy [address] [place] [address] office: [phone] fax: [phone]      Physical Therapy: TREATMENT/PROGRESS NOTE   Patient: Jose Vivas (68 y.o. male)   Treatment Date: 2024   :  1955 MRN: [id number]   Visit #:   Insurance Allowable Auth Needed   BMN []Yes    [x]No    Insurance: Payor: [facility] MEDICARE / Plan: [employer] DUAL COMPLETE / Product Type: *No Product type* /   Insurance ID: [id number] - (Medicare Managed)  Secondary Insurance (if applicable):    Treatment Diagnosis:     ICD-10-CM    1. Weakness  R53.1       2. Stiffness in joint  M25.60       3. Functional gait abnormality  R26.89       4. Decreased functional mobility and endurance  Z74.09       5. Pain  R52          Medical Diagnosis:    Left hip pain [M25.552]  Hip flexor tendinitis, left [M76.892]  H/O total hip arthroplasty, left [Z96.642]   Referring Physician: uJdd Galicia PA  PCP: None, None                             Plan of care signed: NO    Date of Patient follow up with Physician:      Progress Report/POC: NO  POC update due: (10 visits /OR AUTH LIMITS, whichever is less)  3/7/2024     Precautions/ Contra-indications:                                                                                          Latex allergy:  NO  Pacemaker:    NO  Contraindications for Manipulation: NA  Date of Surgery: L/R THR   Other:     Preferred Language for Healthcare:   [x]English       []other:    SUBJECTIVE EXAMINATION     Patient Report/Comments: see eval   Pt c/o onset of L hip pain in December with certain movements that require him to lift the leg and mostly with sneezing w/ pain across lat/ant iliac crest into groin. Pt did have a fall in the summer which he thinks aggravated it.   No injections, pt is unable to take OTC anti- inflammatories.      Relevant Medical History: HIV, OA, L/R THR     Test used Initial score  2024   Pain

## 2024-02-27 ENCOUNTER — HOSPITAL ENCOUNTER (OUTPATIENT)
Dept: PHYSICAL THERAPY | Age: 69
Setting detail: THERAPIES SERIES
Discharge: HOME | End: 2024-02-27
Payer: MEDICARE

## 2024-02-27 PROCEDURE — 97110 THERAPEUTIC EXERCISES: CPT

## 2024-02-27 PROCEDURE — 97035 APP MDLTY 1+ULTRASOUND EA 15: CPT

## 2024-02-27 PROCEDURE — 97530 THERAPEUTIC ACTIVITIES: CPT

## 2024-02-27 NOTE — FLOWSHEET NOTE
time                                                                                                                Status: [] Progressing: [x] Met: [] Not Met: [] Adjusted    Overall Progression Towards Functional goals/ Treatment Progress Update:  [] Patient is progressing as expected towards functional goals listed.    [] Progression is slowed due to complexities/Impairments listed.  [] Progression has been slowed due to co-morbidities.  [x] Plan just implemented, too soon (<30days) to assess goals progression   [] Goals require adjustment due to lack of progress  [] Patient is not progressing as expected and requires additional follow up with physician  [] Other:     CHARGE CAPTURE     PT CHARGE GRID   CPT Code (TIMED) minutes # CPT Code (UNTIMED) #     Therex (17491)  22 1  EVAL:LOW (90591 - Typically 20 minutes face-to-face)     Neuromusc. Re-ed (66119) 2   Re-Eval (35561)     Manual (31261) 2 1  Estim Unattended (15326)     Ther. Act (61986) 8 1  Mech. Traction (70608)     Gait (71581)    Dry Needle 1-2 muscle (53497)     Aquatic Therex (34403)    Dry Needle 3+ muscle (20561)     Iontophoresis (21300)    VASO (04373)     Ultrasound (26855) 8 1  Group Therapy (55776)     Estim Attended (52594)    Canalith Repositioning (64066)     Other:    Other:    Total Timed Code Tx Minutes 42        Total Treatment Minutes 42 + 15        Charge Justification:  (17808) THERAPEUTIC EXERCISE - Provided verbal/tactile cueing for activities related to strengthening, flexibility, endurance, ROM performed to prevent loss of range of motion, maintain or improve muscular strength or increase flexibility, following either an injury or surgery.   (43905) HOME EXERCISE PROGRAM - Reviewed/Progressed HEP activities related to strengthening, flexibility, endurance, ROM performed to prevent loss of range of motion, maintain or improve muscular strength or increase flexibility, following either an injury or surgery.  (75446) NEUROMUSCULAR

## 2024-02-29 ENCOUNTER — HOSPITAL ENCOUNTER (OUTPATIENT)
Dept: PHYSICAL THERAPY | Age: 69
Setting detail: THERAPIES SERIES
Discharge: HOME | End: 2024-02-29
Payer: MEDICARE

## 2024-02-29 PROCEDURE — 97530 THERAPEUTIC ACTIVITIES: CPT

## 2024-02-29 PROCEDURE — 97035 APP MDLTY 1+ULTRASOUND EA 15: CPT

## 2024-02-29 PROCEDURE — 97110 THERAPEUTIC EXERCISES: CPT

## 2024-02-29 NOTE — FLOWSHEET NOTE
prior level of function.                  Status: [x] Progressing: [] Met: [] Not Met: [] Adjusted  Improve hip AROM Flexion and extension to WFL to allow for proper joint functioning as indicated by patients functional deficits.  Status: [x] Progressing: [] Met: [] Not Met: [] Adjusted  Pt to improve strength to 4+/5 or better of proximal hipto allow for proper muscle and joint use in functional mobility, ADLs and prior level of function  Status: [] Progressing: [x] Met: [] Not Met: [] Adjusted  Patient will return to getting in/out of a car without increased symptoms or restriction to work towards return to prior level of function.  Status: [x] Progressing: [] Met: [] Not Met: [] Adjusted  Patient will perform normal ADLs without symptoms >50% of the time                                                                                                                Status: [] Progressing: [x] Met: [] Not Met: [] Adjusted    Overall Progression Towards Functional goals/ Treatment Progress Update:  [] Patient is progressing as expected towards functional goals listed.    [] Progression is slowed due to complexities/Impairments listed.  [] Progression has been slowed due to co-morbidities.  [x] Plan just implemented, too soon (<30days) to assess goals progression   [] Goals require adjustment due to lack of progress  [] Patient is not progressing as expected and requires additional follow up with physician  [] Other:     CHARGE CAPTURE     PT CHARGE GRID   CPT Code (TIMED) minutes # CPT Code (UNTIMED) #     Therex (89437)  20 1  EVAL:LOW (71626 - Typically 20 minutes face-to-face)     Neuromusc. Re-ed (42441) 2   Re-Eval (03332)     Manual (96363) 2 1  Estim Unattended (89669)     Ther. Act (71398) 8 1  [facility] (29447)     Gait (21201)    Dry Needle 1-2 muscle (91566)     Aquatic Therex (47475)    Dry Needle 3+ muscle (50480)     Iontophoresis (90715)    VASO (72299)     Ultrasound (85328) 8 1  Group Therapy

## 2024-03-07 ENCOUNTER — HOSPITAL ENCOUNTER (OUTPATIENT)
Dept: PHYSICAL THERAPY | Age: 69
Setting detail: THERAPIES SERIES
Discharge: HOME | End: 2024-03-07
Payer: MEDICARE

## 2024-03-07 PROCEDURE — 97530 THERAPEUTIC ACTIVITIES: CPT

## 2024-03-07 PROCEDURE — 97112 NEUROMUSCULAR REEDUCATION: CPT

## 2024-03-07 PROCEDURE — 97110 THERAPEUTIC EXERCISES: CPT

## 2024-03-07 NOTE — FLOWSHEET NOTE
function.                  Status: [x] Progressing: [] Met: [] Not Met: [] Adjusted  Improve hip AROM Flexion and extension to WFL to allow for proper joint functioning as indicated by patients functional deficits.  Status: [x] Progressing: [] Met: [] Not Met: [] Adjusted  Pt to improve strength to 4+/5 or better of proximal hipto allow for proper muscle and joint use in functional mobility, ADLs and prior level of function  Status: [] Progressing: [x] Met: [] Not Met: [] Adjusted  Patient will return to getting in/out of a car without increased symptoms or restriction to work towards return to prior level of function.  Status: [x] Progressing: [] Met: [] Not Met: [] Adjusted  Patient will perform normal ADLs without symptoms >50% of the time                                                                                                                Status: [] Progressing: [x] Met: [] Not Met: [] Adjusted    Overall Progression Towards Functional goals/ Treatment Progress Update:  [] Patient is progressing as expected towards functional goals listed.    [] Progression is slowed due to complexities/Impairments listed.  [] Progression has been slowed due to co-morbidities.  [x] Plan just implemented, too soon (<30days) to assess goals progression   [] Goals require adjustment due to lack of progress  [] Patient is not progressing as expected and requires additional follow up with physician  [] Other:     CHARGE CAPTURE     PT CHARGE GRID   CPT Code (TIMED) minutes # CPT Code (UNTIMED) #     Therex (46292)  20 1  EVAL:LOW (62853 - Typically 20 minutes face-to-face)     Neuromusc. Re-ed (67416) 8 1  Re-Eval (61205)     Manual (50825) 2 1  Estim Unattended (64116)     Ther. Act (51269) 8 1  Mech. Traction (37966)     Gait (73089)    Dry Needle 1-2 muscle (02194)     Aquatic Therex (63708)    Dry Needle 3+ muscle (18928)     Iontophoresis (33341)    VASO (02753)     Ultrasound (23233)    Group Therapy (18090)     Estim

## 2024-03-12 ENCOUNTER — HOSPITAL ENCOUNTER (OUTPATIENT)
Dept: PHYSICAL THERAPY | Age: 69
Setting detail: THERAPIES SERIES
Discharge: HOME | End: 2024-03-12
Payer: MEDICARE

## 2024-03-12 PROCEDURE — 97140 MANUAL THERAPY 1/> REGIONS: CPT

## 2024-03-12 PROCEDURE — 97110 THERAPEUTIC EXERCISES: CPT

## 2024-03-12 PROCEDURE — 97112 NEUROMUSCULAR REEDUCATION: CPT

## 2024-03-12 NOTE — FLOWSHEET NOTE
(Mobilization/manipulation, manual lymphatic drainage, manual traction) for the purpose of modulating pain, promoting relaxation,  increasing ROM, reducing/eliminating soft tissue swelling/inflammation/restriction, improving soft tissue extensibility and allowing for proper ROM for normal function with self care, mobility, lifting and ambulation    TREATMENT PLAN   Plan: Cont POC- Continue emphasis/focus on improving proper muscle recruitment and activation/motor control patterns, modulating pain, and increasing ROM. Next visit plan to progress as tolerated/indicated and cont for functional strength progression.       Electronically Signed by Farzana Bentley, PT      MPT [id number]        Date: 03/12/2024     Note: If patient does not return for scheduled/recommended follow up visits, this note will serve as a discharge from care along with the most recent update on progress.

## 2024-03-14 ENCOUNTER — HOSPITAL ENCOUNTER (OUTPATIENT)
Dept: PHYSICAL THERAPY | Age: 69
Setting detail: THERAPIES SERIES
Discharge: HOME | End: 2024-03-14
Payer: MEDICARE

## 2024-03-14 PROCEDURE — 97112 NEUROMUSCULAR REEDUCATION: CPT

## 2024-03-14 PROCEDURE — 97110 THERAPEUTIC EXERCISES: CPT

## 2024-03-14 PROCEDURE — 97140 MANUAL THERAPY 1/> REGIONS: CPT

## 2024-03-14 NOTE — FLOWSHEET NOTE
in 15-minute increments.  (07503) MANUAL THERAPY -  Manual therapy techniques, 1 or more regions, each 15 minutes (Mobilization/manipulation, manual lymphatic drainage, manual traction) for the purpose of modulating pain, promoting relaxation,  increasing ROM, reducing/eliminating soft tissue swelling/inflammation/restriction, improving soft tissue extensibility and allowing for proper ROM for normal function with self care, mobility, lifting and ambulation    TREATMENT PLAN   Plan: Cont POC- Continue emphasis/focus on improving proper muscle recruitment and activation/motor control patterns, modulating pain, and increasing ROM. Next visit plan to progress as tolerated/indicated and cont for functional strength progression.       Electronically Signed by Farzana Bentley, PT      MPT [id number]        Date: 03/14/2024     Note: If patient does not return for scheduled/recommended follow up visits, this note will serve as a discharge from care along with the most recent update on progress.

## 2024-03-19 ENCOUNTER — HOSPITAL ENCOUNTER (OUTPATIENT)
Dept: PHYSICAL THERAPY | Age: 69
Setting detail: THERAPIES SERIES
Discharge: HOME | End: 2024-03-19
Payer: MEDICARE

## 2024-03-19 PROCEDURE — 97112 NEUROMUSCULAR REEDUCATION: CPT

## 2024-03-19 PROCEDURE — 97110 THERAPEUTIC EXERCISES: CPT

## 2024-03-19 PROCEDURE — 97530 THERAPEUTIC ACTIVITIES: CPT

## 2024-03-19 NOTE — FLOWSHEET NOTE
squatting, ascending/descending stairs, walking, bending, lifting, catching, throwing, pushing, pulling, jumping.)  Direct, one on one contact, billed in 15-minute increments.  (14203) MANUAL THERAPY -  Manual therapy techniques, 1 or more regions, each 15 minutes (Mobilization/manipulation, manual lymphatic drainage, manual traction) for the purpose of modulating pain, promoting relaxation,  increasing ROM, reducing/eliminating soft tissue swelling/inflammation/restriction, improving soft tissue extensibility and allowing for proper ROM for normal function with self care, mobility, lifting and ambulation    TREATMENT PLAN   Plan: Cont POC- Continue emphasis/focus on improving proper muscle recruitment and activation/motor control patterns, modulating pain, and increasing ROM. Next visit plan to progress to d/c and independence with hep with [facility].       Electronically Signed by Farzana Bentley, PT      MPT [id number]        Date: 03/19/2024     Note: If patient does not return for scheduled/recommended follow up visits, this note will serve as a discharge from care along with the most recent update on progress.

## 2024-03-22 ENCOUNTER — HOSPITAL ENCOUNTER (OUTPATIENT)
Dept: PHYSICAL THERAPY | Age: 69
Setting detail: THERAPIES SERIES
Discharge: HOME | End: 2024-03-22
Payer: MEDICARE

## 2024-03-22 PROCEDURE — 97110 THERAPEUTIC EXERCISES: CPT

## 2024-03-22 PROCEDURE — 97530 THERAPEUTIC ACTIVITIES: CPT

## 2024-03-22 PROCEDURE — 97112 NEUROMUSCULAR REEDUCATION: CPT

## 2024-03-22 NOTE — FLOWSHEET NOTE
Estim Attended ([id number])    [facility] [employer]ing ([id number])     Other:    Other:    Total Timed Code Tx Minutes 40        Total Treatment Minutes 40 + 15        Charge Justification:  (77150) THERAPEUTIC EXERCISE - Provided verbal/tactile cueing for activities related to strengthening, flexibility, endurance, ROM performed to prevent loss of range of motion, maintain or improve muscular strength or increase flexibility, following either an injury or surgery.   (14113) HOME EXERCISE PROGRAM - Reviewed/Progressed HEP activities related to strengthening, flexibility, endurance, ROM performed to prevent loss of range of motion, maintain or improve muscular strength or increase flexibility, following either an injury or surgery.  (97981) NEUROMUSCULAR RE-EDUCATION - Therapeutic procedure, 1 or more areas, each 15 minutes; neuromuscular reeducation of movement, balance, coordination, kinesthetic sense, posture, and/or proprioception for sitting and/or standing activities  (88698) HOME EXERCISE PROGRAM - Reviewed/Progressed HEP activities related to neuromuscular reeducation of movement, balance, coordination, kinesthetic sense, posture, and/or proprioception for sitting and/or standing activities    (09497) THERAPEUTIC ACTIVITY - use of dynamic activities to improve functional performance. (Ex include squatting, ascending/descending stairs, walking, bending, lifting, catching, throwing, pushing, pulling, jumping.)  Direct, one on one contact, billed in 15-minute increments.  (70899) MANUAL THERAPY -  Manual therapy techniques, 1 or more regions, each 15 minutes (Mobilization/manipulation, manual lymphatic drainage, manual traction) for the purpose of modulating pain, promoting relaxation,  increasing ROM, reducing/eliminating soft tissue swelling/inflammation/restriction, improving soft tissue extensibility and allowing for proper ROM for normal function with self care, mobility, lifting and ambulation    TREATMENT PLAN

## (undated) DEVICE — BIPOLAR SEALER 23-112-1 AQM 6.0: Brand: AQUAMANTYS ®

## (undated) DEVICE — HYPODERMIC SAFETY NEEDLE: Brand: MAGELLAN

## (undated) DEVICE — DUAL CUT SAGITTAL BLADE

## (undated) DEVICE — COVER LT HNDL BLU PLAS

## (undated) DEVICE — Z DISCONTINUED USE 2716304 SUTURE STRATAFIX SPRL SZ 3-0 L12IN ABSRB UD FS-1 L24MM 3/8

## (undated) DEVICE — GLOVE,SURG,SENSICARE SLT,LF,PF,8.5: Brand: MEDLINE

## (undated) DEVICE — CHLORAPREP 26ML ORANGE

## (undated) DEVICE — GOWN,REINF,POLY,AURORA,XLNG/XXL,STRL: Brand: MEDLINE

## (undated) DEVICE — COVER,TABLE,44X90,STERILE: Brand: MEDLINE

## (undated) DEVICE — ALTRX NEUT 40IDX58OD: Type: IMPLANTABLE DEVICE | Status: NON-FUNCTIONAL

## (undated) DEVICE — SYRINGE IRRIG 60ML SFT PLIABLE BLB EZ TO GRP 1 HND USE W/

## (undated) DEVICE — KIT OR ROOM TURNOVER W/STRAP

## (undated) DEVICE — PAD,NON-ADHERENT,3X8,STERILE,LF,1/PK: Brand: MEDLINE

## (undated) DEVICE — 3M™ IOBAN™ 2 ANTIMICROBIAL INCISE DRAPE 6650EZ: Brand: IOBAN™ 2

## (undated) DEVICE — STERILE TOTAL HIP DRAPE PK: Brand: CARDINAL HEALTH

## (undated) DEVICE — Z DISCONTINUED USE 2273271 SOLUTION PREP 4OZ 10% POVIDONE IOD BTL FLIP TOP CAP

## (undated) DEVICE — SUTURE TICRN BR BL NDL C-20 SZ 5 30 8886302779

## (undated) DEVICE — CANISTER, RIGID, 1200CC: Brand: MEDLINE INDUSTRIES, INC.

## (undated) DEVICE — GLOVE,SURG,SENSICARE SLT,LF,PF,8: Brand: MEDLINE

## (undated) DEVICE — COVER,MAYO STAND,STERILE: Brand: MEDLINE

## (undated) DEVICE — DECANTER: Brand: UNBRANDED

## (undated) DEVICE — SOLUTION IV 1000ML 0.9% SOD CHL FOR IRRIG PLAS CONT

## (undated) DEVICE — SUTURE STRATAFIX SPRL SZ 2 0 L14IN ABSRB UD MH L36MM 1 2 CIR SXMD2B401

## (undated) DEVICE — SYRINGE MED 30ML STD CLR PLAS LUERLOCK TIP N CTRL DISP

## (undated) DEVICE — BASIC SINGLE BASIN 1-LF: Brand: MEDLINE INDUSTRIES, INC.

## (undated) DEVICE — SOLUTION IV IRRIG POUR BRL 0.9% SODIUM CHL 2F7124

## (undated) DEVICE — TOTAL BASIC PK

## (undated) DEVICE — SUTURE ABSORBABLE MONOFILAMENT 1-0 OS8 14 IN STRATAFIX SPRL SXPD2B202

## (undated) DEVICE — ELECTRODE BLDE L4IN NONINSULATED EDGE

## (undated) DEVICE — ELECTRODE PT RET AD L9FT HI MOIST COND ADH HYDRGEL CORDED

## (undated) DEVICE — SHEET,DRAPE,53X77,STERILE: Brand: MEDLINE

## (undated) DEVICE — GLOVE SURG SZ 85 L12IN FNGR THK13MIL WHT ISOLEX POLYISOPRENE

## (undated) DEVICE — SUTURE TICRON SZ 2 L30IN NONABSORBABLE BLU HGS-21 1/2 CIR 8886311381

## (undated) DEVICE — SPONGE LAP W18XL18IN WHT COT 4 PLY FLD STRUNG RADPQ DISP ST

## (undated) DEVICE — NEEDLE HYPO 18GA L1.5IN THN WALL PIVOTING SHLD BVL ORIENTED

## (undated) DEVICE — SYRINGE MED 3ML CLR PLAS STD N CTRL LUERLOCK TIP DISP

## (undated) DEVICE — SYSTEM SKIN CLSR 22CM DERMBND PRINEO

## (undated) DEVICE — 3M™ COBAN™ NL STERILE NON-LATEX SELF-ADHERENT WRAP, 2083S, 3 IN X 5 YD (7,5 CM X 4,5 M), 24 ROLLS/CASE: Brand: 3M™ COBAN™